# Patient Record
Sex: FEMALE | Race: WHITE | URBAN - METROPOLITAN AREA
[De-identification: names, ages, dates, MRNs, and addresses within clinical notes are randomized per-mention and may not be internally consistent; named-entity substitution may affect disease eponyms.]

---

## 2020-01-28 ENCOUNTER — APPOINTMENT (RX ONLY)
Dept: URBAN - METROPOLITAN AREA OTHER 10 | Facility: OTHER | Age: 25
Setting detail: DERMATOLOGY
End: 2020-01-28

## 2020-01-28 DIAGNOSIS — L738 OTHER SPECIFIED DISEASES OF HAIR AND HAIR FOLLICLES: ICD-10-CM

## 2020-01-28 DIAGNOSIS — L663 OTHER SPECIFIED DISEASES OF HAIR AND HAIR FOLLICLES: ICD-10-CM

## 2020-01-28 DIAGNOSIS — L73.9 FOLLICULAR DISORDER, UNSPECIFIED: ICD-10-CM

## 2020-01-28 PROBLEM — L02.422 FURUNCLE OF LEFT AXILLA: Status: ACTIVE | Noted: 2020-01-28

## 2020-01-28 PROBLEM — L02.421 FURUNCLE OF RIGHT AXILLA: Status: ACTIVE | Noted: 2020-01-28

## 2020-01-28 PROCEDURE — ? COUNSELING

## 2020-01-28 PROCEDURE — 99202 OFFICE O/P NEW SF 15 MIN: CPT

## 2020-01-28 PROCEDURE — ? PRESCRIPTION

## 2020-01-28 PROCEDURE — ? TREATMENT REGIMEN

## 2020-01-28 RX ORDER — HYDROCORTISONE AND IODOCHLORHYDROXYQUIN 5; 30 MG/G; MG/G
CREAM TOPICAL
Qty: 80 | Refills: 0 | Status: ERX | COMMUNITY
Start: 2020-01-28

## 2020-01-28 RX ADMIN — HYDROCORTISONE AND IODOCHLORHYDROXYQUIN: 5; 30 CREAM TOPICAL at 00:00

## 2020-01-28 ASSESSMENT — SEVERITY ASSESSMENT: SEVERITY: MILD

## 2020-01-28 ASSESSMENT — LOCATION DETAILED DESCRIPTION DERM
LOCATION DETAILED: LEFT AXILLARY VAULT
LOCATION DETAILED: RIGHT AXILLARY VAULT

## 2020-01-28 ASSESSMENT — LOCATION ZONE DERM: LOCATION ZONE: AXILLAE

## 2020-01-28 ASSESSMENT — LOCATION SIMPLE DESCRIPTION DERM
LOCATION SIMPLE: RIGHT AXILLARY VAULT
LOCATION SIMPLE: LEFT AXILLARY VAULT

## 2020-01-28 ASSESSMENT — PAIN INTENSITY VAS: HOW INTENSE IS YOUR PAIN 0 BEING NO PAIN, 10 BEING THE MOST SEVERE PAIN POSSIBLE?: NO PAIN

## 2024-06-11 ENCOUNTER — INITIAL PRENATAL (OUTPATIENT)
Dept: OBGYN | Facility: CLINIC | Age: 29
End: 2024-06-11
Payer: MEDICAID

## 2024-06-11 ENCOUNTER — APPOINTMENT (OUTPATIENT)
Dept: OBGYN | Facility: CLINIC | Age: 29
End: 2024-06-11
Payer: MEDICAID

## 2024-06-11 ENCOUNTER — HOSPITAL ENCOUNTER (OUTPATIENT)
Facility: MEDICAL CENTER | Age: 29
End: 2024-06-11
Payer: MEDICAID

## 2024-06-11 ENCOUNTER — APPOINTMENT (OUTPATIENT)
Dept: RADIOLOGY | Facility: IMAGING CENTER | Age: 29
End: 2024-06-11
Payer: MEDICAID

## 2024-06-11 VITALS
HEIGHT: 67 IN | DIASTOLIC BLOOD PRESSURE: 60 MMHG | SYSTOLIC BLOOD PRESSURE: 110 MMHG | BODY MASS INDEX: 23.89 KG/M2 | WEIGHT: 152.2 LBS

## 2024-06-11 DIAGNOSIS — Z98.890 H/O MYOMECTOMY: ICD-10-CM

## 2024-06-11 DIAGNOSIS — Z34.81 PRENATAL CARE, SUBSEQUENT PREGNANCY, FIRST TRIMESTER: ICD-10-CM

## 2024-06-11 DIAGNOSIS — O34.219 PREVIOUS CESAREAN DELIVERY AFFECTING PREGNANCY: ICD-10-CM

## 2024-06-11 DIAGNOSIS — Z34.81 ENCOUNTER FOR SUPERVISION OF OTHER NORMAL PREGNANCY, FIRST TRIMESTER: ICD-10-CM

## 2024-06-11 DIAGNOSIS — N91.2 AMENORRHEA: ICD-10-CM

## 2024-06-11 PROBLEM — Z34.01 ENCOUNTER FOR SUPERVISION OF NORMAL FIRST PREGNANCY IN FIRST TRIMESTER: Status: ACTIVE | Noted: 2024-06-11

## 2024-06-11 PROBLEM — Z34.01 ENCOUNTER FOR SUPERVISION OF NORMAL FIRST PREGNANCY IN FIRST TRIMESTER: Status: RESOLVED | Noted: 2024-06-11 | Resolved: 2024-06-11

## 2024-06-11 PROCEDURE — 87510 GARDNER VAG DNA DIR PROBE: CPT

## 2024-06-11 PROCEDURE — 3074F SYST BP LT 130 MM HG: CPT

## 2024-06-11 PROCEDURE — 87591 N.GONORRHOEAE DNA AMP PROB: CPT

## 2024-06-11 PROCEDURE — 87660 TRICHOMONAS VAGIN DIR PROBE: CPT

## 2024-06-11 PROCEDURE — 3078F DIAST BP <80 MM HG: CPT

## 2024-06-11 PROCEDURE — 76801 OB US < 14 WKS SINGLE FETUS: CPT | Mod: TC

## 2024-06-11 PROCEDURE — 88175 CYTOPATH C/V AUTO FLUID REDO: CPT

## 2024-06-11 PROCEDURE — 87480 CANDIDA DNA DIR PROBE: CPT

## 2024-06-11 PROCEDURE — 87491 CHLMYD TRACH DNA AMP PROBE: CPT

## 2024-06-11 PROCEDURE — 0500F INITIAL PRENATAL CARE VISIT: CPT

## 2024-06-11 ASSESSMENT — EDINBURGH POSTNATAL DEPRESSION SCALE (EPDS)
I HAVE BEEN ABLE TO LAUGH AND SEE THE FUNNY SIDE OF THINGS: AS MUCH AS I ALWAYS COULD
I HAVE BEEN SO UNHAPPY THAT I HAVE BEEN CRYING: NO, NEVER
THE THOUGHT OF HARMING MYSELF HAS OCCURRED TO ME: NEVER
I HAVE BLAMED MYSELF UNNECESSARILY WHEN THINGS WENT WRONG: NOT VERY OFTEN
THINGS HAVE BEEN GETTING ON TOP OF ME: NO, I HAVE BEEN COPING AS WELL AS EVER
I HAVE BEEN SO UNHAPPY THAT I HAVE HAD DIFFICULTY SLEEPING: NOT AT ALL
I HAVE LOOKED FORWARD WITH ENJOYMENT TO THINGS: RATHER LESS THAN I USED TO
TOTAL SCORE: 5
I HAVE FELT SAD OR MISERABLE: NO, NOT AT ALL
I HAVE BEEN ANXIOUS OR WORRIED FOR NO GOOD REASON: YES, SOMETIMES
I HAVE FELT SCARED OR PANICKY FOR NO GOOD REASON: NO, NOT MUCH

## 2024-06-11 NOTE — PROGRESS NOTES
NOB today  LMP:04-09-24  GA : 10w2d  SWAPNIL : [birth year]  Last pap: 2021 wnl due today   Phone #[phone]  Pharmacy confirmed   On PNV   Genetic screening yes   EPDS= 5   c/o a lot of pelvic tenderness nausea,shortness of breath

## 2024-06-12 LAB
CANDIDA DNA VAG QL PROBE+SIG AMP: NEGATIVE
G VAGINALIS DNA VAG QL PROBE+SIG AMP: NEGATIVE
T VAGINALIS DNA VAG QL PROBE+SIG AMP: NEGATIVE

## 2024-06-14 LAB
C TRACH RRNA CVX QL NAA+PROBE: NEGATIVE
COMMENT NL11729A: NORMAL
CYTOLOGIST CVX/VAG CYTO: NORMAL
CYTOLOGY CVX/VAG DOC CYTO: NORMAL
CYTOLOGY CVX/VAG DOC THIN PREP: NORMAL
N GONORRHOEA RRNA CVX QL NAA+PROBE: NEGATIVE
NOTE NL11727A: NORMAL
OTHER STN SPEC: NORMAL
STAT OF ADQ CVX/VAG CYTO-IMP: NORMAL

## 2024-06-18 ENCOUNTER — HOSPITAL ENCOUNTER (OUTPATIENT)
Dept: LAB | Facility: MEDICAL CENTER | Age: 29
End: 2024-06-18
Payer: MEDICAID

## 2024-06-18 DIAGNOSIS — Z34.81 PRENATAL CARE, SUBSEQUENT PREGNANCY, FIRST TRIMESTER: ICD-10-CM

## 2024-06-18 LAB
ABO GROUP BLD: NORMAL
BLD GP AB SCN SERPL QL: NORMAL
ERYTHROCYTE [DISTWIDTH] IN BLOOD BY AUTOMATED COUNT: 40.3 FL (ref 35.9–50)
HBV SURFACE AG SER QL: NORMAL
HCT VFR BLD AUTO: 41.8 % (ref 37–47)
HCV AB SER QL: NORMAL
HGB BLD-MCNC: 14.3 G/DL (ref 12–16)
HIV 1+2 AB+HIV1 P24 AG SERPL QL IA: NORMAL
MCH RBC QN AUTO: 31.4 PG (ref 27–33)
MCHC RBC AUTO-ENTMCNC: 34.2 G/DL (ref 32.2–35.5)
MCV RBC AUTO: 91.9 FL (ref 81.4–97.8)
PLATELET # BLD AUTO: 245 K/UL (ref 164–446)
PMV BLD AUTO: 9.7 FL (ref 9–12.9)
RBC # BLD AUTO: 4.55 M/UL (ref 4.2–5.4)
RH BLD: NORMAL
RUBV AB SER QL: >500 IU/ML
T PALLIDUM AB SER QL IA: NORMAL
WBC # BLD AUTO: 8 K/UL (ref 4.8–10.8)

## 2024-06-18 PROCEDURE — 87086 URINE CULTURE/COLONY COUNT: CPT

## 2024-06-18 PROCEDURE — 80307 DRUG TEST PRSMV CHEM ANLYZR: CPT

## 2024-06-18 PROCEDURE — 86901 BLOOD TYPING SEROLOGIC RH(D): CPT

## 2024-06-18 PROCEDURE — 86900 BLOOD TYPING SEROLOGIC ABO: CPT

## 2024-06-18 PROCEDURE — 86762 RUBELLA ANTIBODY: CPT

## 2024-06-18 PROCEDURE — 86850 RBC ANTIBODY SCREEN: CPT

## 2024-06-18 PROCEDURE — 36415 COLL VENOUS BLD VENIPUNCTURE: CPT

## 2024-06-18 PROCEDURE — 87340 HEPATITIS B SURFACE AG IA: CPT

## 2024-06-18 PROCEDURE — 85027 COMPLETE CBC AUTOMATED: CPT

## 2024-06-18 PROCEDURE — 86780 TREPONEMA PALLIDUM: CPT

## 2024-06-18 PROCEDURE — 87389 HIV-1 AG W/HIV-1&-2 AB AG IA: CPT

## 2024-06-18 PROCEDURE — 86803 HEPATITIS C AB TEST: CPT

## 2024-06-19 LAB
AMPHET CTO UR CFM-MCNC: NEGATIVE NG/ML
BARBITURATES CTO UR CFM-MCNC: NEGATIVE NG/ML
BENZODIAZ CTO UR CFM-MCNC: NEGATIVE NG/ML
CANNABINOIDS CTO UR CFM-MCNC: NEGATIVE NG/ML
COCAINE CTO UR CFM-MCNC: NEGATIVE NG/ML
CREAT UR-MCNC: 80.3 MG/DL (ref 20–400)
DRUG COMMENT 753798: NORMAL
METHADONE CTO UR CFM-MCNC: NEGATIVE NG/ML
OPIATES CTO UR CFM-MCNC: NEGATIVE NG/ML
PCP CTO UR CFM-MCNC: NEGATIVE NG/ML
PROPOXYPH CTO UR CFM-MCNC: NEGATIVE NG/ML

## 2024-06-20 LAB
BACTERIA UR CULT: NORMAL
SIGNIFICANT IND 70042: NORMAL
SITE SITE: NORMAL
SOURCE SOURCE: NORMAL

## 2024-06-27 LAB
Lab: NORMAL
NTRA 1P36 DELETION SYNDROME POPULATION-BASED RISK TEXT: NORMAL
NTRA 1P36 DELETION SYNDROME RESULT TEXT: NORMAL
NTRA 1P36 DELETION SYNDROME RISK SCORE TEXT: NORMAL
NTRA 22Q11.2 DELETION SYNDROME POPULATION-BASED RISK TEXT: NORMAL
NTRA 22Q11.2 DELETION SYNDROME RESULT TEXT: NORMAL
NTRA 22Q11.2 DELETION SYNDROME RISK SCORE TEXT: NORMAL
NTRA ANGELMAN SYNDROME POPULATION-BASED RISK TEXT: NORMAL
NTRA ANGELMAN SYNDROME RESULT TEXT: NORMAL
NTRA ANGELMAN SYNDROME RISK SCORE TEXT: NORMAL
NTRA CRI-DU-CHAT SYNDROME POPULATION-BASED RISK TEXT: NORMAL
NTRA CRI-DU-CHAT SYNDROME RESULT TEXT: NORMAL
NTRA CRI-DU-CHAT SYNDROME RISK SCORE TEXT: NORMAL
NTRA FETAL FRACTION: NORMAL
NTRA GENDER OF FETUS: NORMAL
NTRA MONOSOMY X AGE-BASED RISK TEXT: NORMAL
NTRA MONOSOMY X RESULT TEXT: NORMAL
NTRA MONOSOMY X RISK SCORE TEXT: NORMAL
NTRA PRADER-WILLI SYNDROME POPULATION-BASED RISK TEXT: NORMAL
NTRA PRADER-WILLI SYNDROME RESULT TEXT: NORMAL
NTRA PRADER-WILLI SYNDROME RISK SCORE TEXT: NORMAL
NTRA TRIPLOIDY RESULT TEXT: NORMAL
NTRA TRISOMY 13 AGE-BASED RISK TEXT: NORMAL
NTRA TRISOMY 13 RESULT TEXT: NORMAL
NTRA TRISOMY 13 RISK SCORE TEXT: NORMAL
NTRA TRISOMY 18 AGE-BASED RISK TEXT: NORMAL
NTRA TRISOMY 18 RESULT TEXT: NORMAL
NTRA TRISOMY 18 RISK SCORE TEXT: NORMAL
NTRA TRISOMY 21 AGE-BASED RISK TEXT: NORMAL
NTRA TRISOMY 21 RESULT TEXT: NORMAL
NTRA TRISOMY 21 RISK SCORE TEXT: NORMAL

## 2024-08-01 ENCOUNTER — ROUTINE PRENATAL (OUTPATIENT)
Dept: OBGYN | Facility: CLINIC | Age: 29
End: 2024-08-01
Payer: MEDICAID

## 2024-08-01 VITALS — SYSTOLIC BLOOD PRESSURE: 104 MMHG | DIASTOLIC BLOOD PRESSURE: 56 MMHG | BODY MASS INDEX: 24.28 KG/M2 | WEIGHT: 155 LBS

## 2024-08-01 DIAGNOSIS — O34.219 PREVIOUS CESAREAN DELIVERY AFFECTING PREGNANCY: ICD-10-CM

## 2024-08-01 PROCEDURE — 0502F SUBSEQUENT PRENATAL CARE: CPT | Performed by: OBSTETRICS & GYNECOLOGY

## 2024-08-01 PROCEDURE — 3078F DIAST BP <80 MM HG: CPT | Performed by: OBSTETRICS & GYNECOLOGY

## 2024-08-01 PROCEDURE — 3074F SYST BP LT 130 MM HG: CPT | Performed by: OBSTETRICS & GYNECOLOGY

## 2024-08-01 NOTE — PROGRESS NOTES
OB Followup;    17w4d    Patient Active Problem List    Diagnosis Date Noted    Previous CS--desires TOLAC (see note) 2024    Prenatal care, subsequent pregnancy, first trimester 2024    History of back surgery 2024    ?H/O myomectomy 2024       Vitals:    24 1257   BP: 104/56   Weight: 155 lb       Patient presents for followup of OB care. Currently doing well . Good fetal movement no leakage of fluid no contractions or vaginal bleeding        Size equals dates, normal fetal heart rate      #1 previous  section performed in [place] for arrest of dilatation 6-7 cm per patient history-9 pound infant  Patient would like   Patient is a candidate for  and we discussed moderating her weight gain as she gained 55 pounds with her last pregnancy to increase her chances for a smaller infant we discussed the difficulty of accurate size determination and her third trimester ultrasound.   counseling performed discussed 1% risk of uterine rupture with need for emergency  section and possible neurologic sequela to infant and possible heavy blood loss as well as all other risks as notated in the  consent form she is being given extensive opportunity to ask questions and reviewed all of these in detail.   consent signed  Operation note from her previous  section would be helpful  Due to history of previous 9 pound infant recommend early 1 hour Glucola as well as MSAFP-NIPS testing low risk female    Patient does not desire permanent sterilization      Labor precautions given  Discussed proper weight gain during pregnancy.    Signs and symptoms of labor/ labor discussed   Discussed proper exercise during pregnancy  Discussed proper oral fluid hydration  Reviewed fetal kick counts and appropriate fetal movement during pregnancy  Reviewed postpartum birth control methods  All questions answered in detail    Followup in  4 weeks

## 2024-08-20 ENCOUNTER — ROUTINE PRENATAL (OUTPATIENT)
Dept: OBGYN | Facility: CLINIC | Age: 29
End: 2024-08-20
Payer: MEDICAID

## 2024-08-20 VITALS — DIASTOLIC BLOOD PRESSURE: 52 MMHG | WEIGHT: 157 LBS | BODY MASS INDEX: 24.59 KG/M2 | SYSTOLIC BLOOD PRESSURE: 98 MMHG

## 2024-08-20 DIAGNOSIS — Z34.82 PRENATAL CARE, SUBSEQUENT PREGNANCY IN SECOND TRIMESTER: Primary | ICD-10-CM

## 2024-08-20 PROBLEM — Z34.80 PRENATAL CARE, SUBSEQUENT PREGNANCY: Status: ACTIVE | Noted: 2024-06-11

## 2024-08-20 PROCEDURE — 3074F SYST BP LT 130 MM HG: CPT | Performed by: NURSE PRACTITIONER

## 2024-08-20 PROCEDURE — 3078F DIAST BP <80 MM HG: CPT | Performed by: NURSE PRACTITIONER

## 2024-08-20 PROCEDURE — 0502F SUBSEQUENT PRENATAL CARE: CPT | Performed by: NURSE PRACTITIONER

## 2024-08-20 NOTE — PROGRESS NOTES
OB follow up   + fetal movement.  No VB, LOF or UC's.  Phone # [phone]  Preferred pharmacy confirmed.  AFP maternal, 1 GTT not done yet.  US scheduled for 9/16/24

## 2024-08-23 ENCOUNTER — HOSPITAL ENCOUNTER (OUTPATIENT)
Dept: LAB | Facility: MEDICAL CENTER | Age: 29
End: 2024-08-23
Attending: OBSTETRICS & GYNECOLOGY
Payer: MEDICAID

## 2024-08-23 DIAGNOSIS — O34.219 PREVIOUS CESAREAN DELIVERY AFFECTING PREGNANCY: ICD-10-CM

## 2024-08-23 LAB — GLUCOSE 1H P 50 G GLC PO SERPL-MCNC: 85 MG/DL (ref 70–139)

## 2024-08-23 PROCEDURE — 36415 COLL VENOUS BLD VENIPUNCTURE: CPT

## 2024-08-23 PROCEDURE — 82950 GLUCOSE TEST: CPT

## 2024-08-23 PROCEDURE — 82105 ALPHA-FETOPROTEIN SERUM: CPT

## 2024-08-26 LAB
# FETUSES US: NORMAL
AFP MOM SERPL: 1.06
AFP SERPL-MCNC: 69 NG/ML
AGE - REPORTED: 28.5 YR
CURRENT SMOKER: NO
FAMILY MEMBER DISEASES HX: NO
GA METHOD: NORMAL
GA: NORMAL WK
IDDM PATIENT QL: NO
INTEGRATED SCN PATIENT-IMP: NORMAL
SPECIMEN DRAWN SERPL: NORMAL

## 2024-09-04 ENCOUNTER — ROUTINE PRENATAL (OUTPATIENT)
Dept: OBGYN | Facility: CLINIC | Age: 29
End: 2024-09-04
Payer: MEDICAID

## 2024-09-04 VITALS — DIASTOLIC BLOOD PRESSURE: 56 MMHG | BODY MASS INDEX: 25.15 KG/M2 | WEIGHT: 160.6 LBS | SYSTOLIC BLOOD PRESSURE: 106 MMHG

## 2024-09-04 DIAGNOSIS — Z98.890 H/O MYOMECTOMY: ICD-10-CM

## 2024-09-04 DIAGNOSIS — O34.219 PREVIOUS CESAREAN DELIVERY AFFECTING PREGNANCY: ICD-10-CM

## 2024-09-04 DIAGNOSIS — Z34.82 PRENATAL CARE, SUBSEQUENT PREGNANCY IN SECOND TRIMESTER: ICD-10-CM

## 2024-09-04 PROCEDURE — 3078F DIAST BP <80 MM HG: CPT | Performed by: OBSTETRICS & GYNECOLOGY

## 2024-09-04 PROCEDURE — 0502F SUBSEQUENT PRENATAL CARE: CPT | Performed by: OBSTETRICS & GYNECOLOGY

## 2024-09-04 PROCEDURE — 3074F SYST BP LT 130 MM HG: CPT | Performed by: OBSTETRICS & GYNECOLOGY

## 2024-09-04 NOTE — PROGRESS NOTES
OB Visit Note - 22w3d     MEDICAL DECISION MAKING:  Kelly Alcocer is a 29 y.o. female  at 22w3d    Today's visit addressed:   Doing well. Some mild pain on sides of pelvis, likely ligament pain. No VB or LOF. Good FM.   Requests bedside sono today.     Pregnancy complicated by:  Patient Active Problem List   Diagnosis    Previous CS--desires TOLAC (see note)    Prenatal care, subsequent pregnancy    History of back surgery    ?H/O myomectomy     Patient tried to schedule her anatomy scan however was told it was the 'wrong order'. She has not had her anatomy scan yet.  New order for stat anatomy US placed.    Patient did fill out records request through medical records however no confirmation that this was done, and no records seen. Another records request to be filled out and sent for her prior C/S op report. If hx myomectomy and/or vertical uterine incision, would not be a candidate for TOLAC.     The patient will follow up in 4 week(s). She was counseled to call or return for vaginal bleeding, regular contractions, leakage of fluid.    Elizabeth Sanchez M.D.

## 2024-09-04 NOTE — PROGRESS NOTES
Pt here today for OBFV   +FM movemnet  No VB, LOF. Uc's   Phone number [phone]  Pharmacy verified   Pt states soreness on side, and do I need a us for 20 weeks haven't had one

## 2024-09-05 ENCOUNTER — HOSPITAL ENCOUNTER (OUTPATIENT)
Dept: RADIOLOGY | Facility: MEDICAL CENTER | Age: 29
End: 2024-09-05
Attending: OBSTETRICS & GYNECOLOGY
Payer: MEDICAID

## 2024-09-05 ENCOUNTER — TELEPHONE (OUTPATIENT)
Dept: OBGYN | Facility: CLINIC | Age: 29
End: 2024-09-05

## 2024-09-05 DIAGNOSIS — Z34.82 PRENATAL CARE, SUBSEQUENT PREGNANCY IN SECOND TRIMESTER: ICD-10-CM

## 2024-09-05 PROCEDURE — 76805 OB US >/= 14 WKS SNGL FETUS: CPT

## 2024-09-06 ENCOUNTER — TELEPHONE (OUTPATIENT)
Dept: OBGYN | Facility: CLINIC | Age: 29
End: 2024-09-06
Payer: MEDICAID

## 2024-09-06 NOTE — TELEPHONE ENCOUNTER
Pt called for results to her US. I told pt they have not been reviewed but I will send a message to the provider to review. Pt stated she understands and call was disconnected.

## 2024-09-11 ENCOUNTER — TELEPHONE (OUTPATIENT)
Dept: OBGYN | Facility: CLINIC | Age: 29
End: 2024-09-11
Payer: MEDICAID

## 2024-09-11 NOTE — TELEPHONE ENCOUNTER
Patient called stating she would like to know if her ultrasound was okay. Informed patient that if results are normal we usually don't give a call but she wanted to be sure.

## 2024-09-17 DIAGNOSIS — O44.40 LOW-LYING PLACENTA: ICD-10-CM

## 2024-09-18 ENCOUNTER — TELEPHONE (OUTPATIENT)
Dept: OBGYN | Facility: CLINIC | Age: 29
End: 2024-09-18
Payer: MEDICAID

## 2024-10-02 ENCOUNTER — ROUTINE PRENATAL (OUTPATIENT)
Dept: OBGYN | Facility: CLINIC | Age: 29
End: 2024-10-02
Payer: MEDICAID

## 2024-10-02 VITALS — BODY MASS INDEX: 26.03 KG/M2 | SYSTOLIC BLOOD PRESSURE: 110 MMHG | DIASTOLIC BLOOD PRESSURE: 60 MMHG | WEIGHT: 166.2 LBS

## 2024-10-02 DIAGNOSIS — Z34.82 PRENATAL CARE, SUBSEQUENT PREGNANCY IN SECOND TRIMESTER: ICD-10-CM

## 2024-10-02 DIAGNOSIS — Z34.83 PRENATAL CARE, SUBSEQUENT PREGNANCY, THIRD TRIMESTER: ICD-10-CM

## 2024-10-02 DIAGNOSIS — Z34.82 PRENATAL CARE, SUBSEQUENT PREGNANCY, SECOND TRIMESTER: ICD-10-CM

## 2024-10-02 PROCEDURE — 3078F DIAST BP <80 MM HG: CPT

## 2024-10-02 PROCEDURE — 0502F SUBSEQUENT PRENATAL CARE: CPT

## 2024-10-02 PROCEDURE — 3074F SYST BP LT 130 MM HG: CPT

## 2024-10-28 ENCOUNTER — ANCILLARY PROCEDURE (OUTPATIENT)
Dept: OBGYN | Facility: CLINIC | Age: 29
End: 2024-10-28
Payer: MEDICAID

## 2024-10-28 ENCOUNTER — APPOINTMENT (OUTPATIENT)
Dept: OBGYN | Facility: CLINIC | Age: 29
End: 2024-10-28
Payer: MEDICAID

## 2024-10-28 VITALS
BODY MASS INDEX: 27.1 KG/M2 | SYSTOLIC BLOOD PRESSURE: 127 MMHG | HEART RATE: 75 BPM | WEIGHT: 173 LBS | DIASTOLIC BLOOD PRESSURE: 75 MMHG

## 2024-10-28 VITALS — DIASTOLIC BLOOD PRESSURE: 60 MMHG | BODY MASS INDEX: 27.1 KG/M2 | SYSTOLIC BLOOD PRESSURE: 108 MMHG | WEIGHT: 173 LBS

## 2024-10-28 DIAGNOSIS — Z34.83 PRENATAL CARE, SUBSEQUENT PREGNANCY IN THIRD TRIMESTER: ICD-10-CM

## 2024-10-28 DIAGNOSIS — O44.40 LOW-LYING PLACENTA: ICD-10-CM

## 2024-10-28 PROCEDURE — 90472 IMMUNIZATION ADMIN EACH ADD: CPT | Performed by: MIDWIFE

## 2024-10-28 PROCEDURE — 90656 IIV3 VACC NO PRSV 0.5 ML IM: CPT | Performed by: MIDWIFE

## 2024-10-28 PROCEDURE — 90715 TDAP VACCINE 7 YRS/> IM: CPT | Mod: JZ | Performed by: MIDWIFE

## 2024-10-28 PROCEDURE — 90471 IMMUNIZATION ADMIN: CPT | Performed by: MIDWIFE

## 2024-10-28 PROCEDURE — 76816 OB US FOLLOW-UP PER FETUS: CPT | Performed by: OBSTETRICS & GYNECOLOGY

## 2024-10-30 PROBLEM — O36.5930 POOR FETAL GROWTH AFFECTING MANAGEMENT OF MOTHER IN THIRD TRIMESTER: Status: ACTIVE | Noted: 2024-10-30

## 2024-11-05 ENCOUNTER — APPOINTMENT (OUTPATIENT)
Dept: OBGYN | Facility: CLINIC | Age: 29
End: 2024-11-05
Payer: MEDICAID

## 2024-11-13 ENCOUNTER — HOSPITAL ENCOUNTER (OUTPATIENT)
Dept: LAB | Facility: MEDICAL CENTER | Age: 29
End: 2024-11-13
Attending: MIDWIFE
Payer: MEDICAID

## 2024-11-13 DIAGNOSIS — Z34.83 PRENATAL CARE, SUBSEQUENT PREGNANCY IN THIRD TRIMESTER: ICD-10-CM

## 2024-11-13 LAB
BASOPHILS # BLD AUTO: 0.3 % (ref 0–1.8)
BASOPHILS # BLD: 0.02 K/UL (ref 0–0.12)
EOSINOPHIL # BLD AUTO: 0.06 K/UL (ref 0–0.51)
EOSINOPHIL NFR BLD: 0.8 % (ref 0–6.9)
ERYTHROCYTE [DISTWIDTH] IN BLOOD BY AUTOMATED COUNT: 45.7 FL (ref 35.9–50)
GLUCOSE 1H P 50 G GLC PO SERPL-MCNC: 100 MG/DL (ref 70–139)
HCT VFR BLD AUTO: 39.5 % (ref 37–47)
HGB BLD-MCNC: 12.8 G/DL (ref 12–16)
IMM GRANULOCYTES # BLD AUTO: 0.03 K/UL (ref 0–0.11)
IMM GRANULOCYTES NFR BLD AUTO: 0.4 % (ref 0–0.9)
LYMPHOCYTES # BLD AUTO: 1.66 K/UL (ref 1–4.8)
LYMPHOCYTES NFR BLD: 21.6 % (ref 22–41)
MCH RBC QN AUTO: 31.3 PG (ref 27–33)
MCHC RBC AUTO-ENTMCNC: 32.4 G/DL (ref 32.2–35.5)
MCV RBC AUTO: 96.6 FL (ref 81.4–97.8)
MONOCYTES # BLD AUTO: 0.45 K/UL (ref 0–0.85)
MONOCYTES NFR BLD AUTO: 5.9 % (ref 0–13.4)
NEUTROPHILS # BLD AUTO: 5.47 K/UL (ref 1.82–7.42)
NEUTROPHILS NFR BLD: 71 % (ref 44–72)
NRBC # BLD AUTO: 0 K/UL
NRBC BLD-RTO: 0 /100 WBC (ref 0–0.2)
PLATELET # BLD AUTO: 243 K/UL (ref 164–446)
PMV BLD AUTO: 9.7 FL (ref 9–12.9)
RBC # BLD AUTO: 4.09 M/UL (ref 4.2–5.4)
T PALLIDUM AB SER QL IA: NORMAL
WBC # BLD AUTO: 7.7 K/UL (ref 4.8–10.8)

## 2024-11-13 PROCEDURE — 82950 GLUCOSE TEST: CPT

## 2024-11-13 PROCEDURE — 36415 COLL VENOUS BLD VENIPUNCTURE: CPT

## 2024-11-13 PROCEDURE — 85025 COMPLETE CBC W/AUTO DIFF WBC: CPT

## 2024-11-13 PROCEDURE — 86780 TREPONEMA PALLIDUM: CPT

## 2024-11-14 ENCOUNTER — ROUTINE PRENATAL (OUTPATIENT)
Dept: OBGYN | Facility: CLINIC | Age: 29
End: 2024-11-14
Payer: MEDICAID

## 2024-11-14 ENCOUNTER — APPOINTMENT (OUTPATIENT)
Dept: OBGYN | Facility: CLINIC | Age: 29
End: 2024-11-14
Attending: OBSTETRICS & GYNECOLOGY
Payer: MEDICAID

## 2024-11-14 ENCOUNTER — NON-PROVIDER VISIT (OUTPATIENT)
Dept: OBGYN | Facility: CLINIC | Age: 29
End: 2024-11-14
Payer: MEDICAID

## 2024-11-14 VITALS — SYSTOLIC BLOOD PRESSURE: 110 MMHG | WEIGHT: 173 LBS | BODY MASS INDEX: 27.1 KG/M2 | DIASTOLIC BLOOD PRESSURE: 60 MMHG

## 2024-11-14 DIAGNOSIS — Z98.890 H/O MYOMECTOMY: ICD-10-CM

## 2024-11-14 DIAGNOSIS — O36.5930 POOR FETAL GROWTH AFFECTING MANAGEMENT OF MOTHER IN THIRD TRIMESTER, SINGLE OR UNSPECIFIED FETUS: ICD-10-CM

## 2024-11-14 DIAGNOSIS — Z34.83 PRENATAL CARE, SUBSEQUENT PREGNANCY IN THIRD TRIMESTER: ICD-10-CM

## 2024-11-14 PROBLEM — O44.40 LOW-LYING PLACENTA: Status: RESOLVED | Noted: 2024-09-17 | Resolved: 2024-11-14

## 2024-11-14 PROCEDURE — 3078F DIAST BP <80 MM HG: CPT | Performed by: MIDWIFE

## 2024-11-14 PROCEDURE — 3074F SYST BP LT 130 MM HG: CPT | Performed by: MIDWIFE

## 2024-11-14 PROCEDURE — 0502F SUBSEQUENT PRENATAL CARE: CPT | Performed by: MIDWIFE

## 2024-11-14 NOTE — PROGRESS NOTES
Pt here today for OB follow up  Pt states no complaints  Labs and US done  Reports +FM  Good # [phone] (home)    Pharmacy Confirmed.  Chaperone offered and declined.

## 2024-11-14 NOTE — PROGRESS NOTES
S: Pt is a 29 y.o.  at 32w4d gestation here today for routine prenatal care.     Concerns today include:  Denies concerns. Need resend RYLIE for myomectomy notes    Denies: vaginal bleeding, pelvic and abdominal pain, cramping, contractions, leaking of fluid, urinary and vaginal symptoms    Pt reports fetal movement as Present     O: /60   Wt 173 lb   LMP 2024 (Exact Date)   BMI 27.10 kg/m²    *see prenatal flowsheet*     Labs:     Prenatal labs: Completed and normal  GCT: 100   GBS: Not yet collected  Genetic testing: NIPT low risk  STI testing: negative    Ultrasound: none since last visit    Nonstress Test  Reason for NST: Intrauterine growth restriction  Variability: Moderate  Decelerations: None  Accelerations: No  Acoustic Stimulator: No  Baseline: 140  Uterine Irritability: No  Contractions: Not present  Nonstress Test Interpretation  Comments: NST reactive per my read    A/P:     Problem List Items Addressed This Visit          Unprioritized    Prenatal care, subsequent pregnancy     Pt is a 29 y.o.  at 32w4d gestation here today for routine prenatal care.   Size equal to dates  FGR  RNST    Discuss  labor and pre-eclampsia precautions.  Discuss FMA and FKCs  Address concerns: Discuss need to clarify if myomectomy occurred as this would be an indication for repeat CS. Records were sent 10/2 but nothing received. Will resend RYLIE today.   GBS Not yet collected  Rh positive  Tdap and flu: Administered prior visit  Reviewed 3rd tri labs: normal  Labs ordered: none  Imaging ordered: none   RTC for US and NST next week. RTC for PNV in 2 wks.          ?H/O myomectomy     Records request sent again today         Poor fetal growth affecting management of mother in third trimester     -No US follow up since diagnosis of FGR. Had US scheduled today but had to cancel because she did not have childcare.  -NST reactive - unclear if mild variables x2 occurred at very beginning of tracing  and baseline was indeterminate for about 20-30 minutes as baby was very active. Reviewed tracing with Dr. Sharma who agrees that overall the tracing is very reassuring.   - US is rescheduled for 11/19 at 1100  - Plan for wkly NST

## 2024-11-14 NOTE — ASSESSMENT & PLAN NOTE
-No US follow up since diagnosis of FGR. Had US scheduled today but had to cancel because she did not have childcare.  -NST reactive - unclear if mild variables x2 occurred at very beginning of tracing and baseline was indeterminate for about 20-30 minutes as baby was very active. Reviewed tracing with Dr. Sharma who agrees that overall the tracing is very reassuring.   - US is rescheduled for 11/19 at 1100  - Plan for wkly NST

## 2024-11-14 NOTE — ASSESSMENT & PLAN NOTE
Pt is a 29 y.o.  at 32w4d gestation here today for routine prenatal care.   Size equal to dates  FGR  RNST    Discuss  labor and pre-eclampsia precautions.  Discuss FMA and FKCs  Address concerns: Discuss need to clarify if myomectomy occurred as this would be an indication for repeat CS. Records were sent 10/2 but nothing received. Will resend RYLIE today.   GBS Not yet collected  Rh positive  Tdap and flu: Administered prior visit  Reviewed 3rd tri labs: normal  Labs ordered: none  Imaging ordered: none   RTC for US and NST next week. RTC for PNV in 2 wks.    Adult

## 2024-11-19 ENCOUNTER — ANCILLARY PROCEDURE (OUTPATIENT)
Dept: OBGYN | Facility: CLINIC | Age: 29
End: 2024-11-19
Attending: OBSTETRICS & GYNECOLOGY
Payer: MEDICAID

## 2024-11-19 VITALS
DIASTOLIC BLOOD PRESSURE: 68 MMHG | SYSTOLIC BLOOD PRESSURE: 128 MMHG | BODY MASS INDEX: 27.69 KG/M2 | HEART RATE: 90 BPM | WEIGHT: 176.8 LBS

## 2024-11-19 DIAGNOSIS — Z34.82 PRENATAL CARE, SUBSEQUENT PREGNANCY IN SECOND TRIMESTER: ICD-10-CM

## 2024-11-19 PROCEDURE — 76820 UMBILICAL ARTERY ECHO: CPT | Performed by: OBSTETRICS & GYNECOLOGY

## 2024-11-19 PROCEDURE — 76816 OB US FOLLOW-UP PER FETUS: CPT | Performed by: OBSTETRICS & GYNECOLOGY

## 2024-11-27 ENCOUNTER — ROUTINE PRENATAL (OUTPATIENT)
Dept: OBGYN | Facility: CLINIC | Age: 29
End: 2024-11-27
Payer: MEDICAID

## 2024-11-27 VITALS — DIASTOLIC BLOOD PRESSURE: 60 MMHG | WEIGHT: 179 LBS | BODY MASS INDEX: 28.04 KG/M2 | SYSTOLIC BLOOD PRESSURE: 110 MMHG

## 2024-11-27 DIAGNOSIS — O34.219 PREVIOUS CESAREAN DELIVERY AFFECTING PREGNANCY: ICD-10-CM

## 2024-11-27 DIAGNOSIS — Z34.83 PRENATAL CARE, SUBSEQUENT PREGNANCY IN THIRD TRIMESTER: ICD-10-CM

## 2024-11-27 PROBLEM — Z98.890 H/O MYOMECTOMY: Status: RESOLVED | Noted: 2024-06-11 | Resolved: 2024-11-27

## 2024-11-27 PROCEDURE — 3078F DIAST BP <80 MM HG: CPT | Performed by: MIDWIFE

## 2024-11-27 PROCEDURE — 3074F SYST BP LT 130 MM HG: CPT | Performed by: MIDWIFE

## 2024-11-27 PROCEDURE — 0502F SUBSEQUENT PRENATAL CARE: CPT | Performed by: MIDWIFE

## 2024-11-27 RX ORDER — BREAST PUMP
1 EACH MISCELLANEOUS PRN
Qty: 1 EACH | Refills: 0 | Status: SHIPPED
Start: 2024-11-27

## 2024-11-27 NOTE — PROGRESS NOTES
S: Pt is a 29 y.o.  at 34w3d gestation here today for routine prenatal care.     Concerns today include:  Pt find records that show she had bilateral teratomas removed during her  with first baby. She did not have a myomectomy. These are reviewed and found in her chart in media, listed under outside labs. She desires TOLAC    Denies: vaginal bleeding, pelvic and abdominal pain, cramping, contractions, leaking of fluid, urinary and vaginal symptoms    Pt reports fetal movement as Present     O: /60   Wt 179 lb   LMP 2024 (Exact Date)   BMI 28.04 kg/m²    *see prenatal flowsheet*     Labs:     Prenatal labs: Completed and normal  GCT: 100   GBS: Not yet collected  Genetic testing: NIPT low risk  STI testing: negative    Ultrasound:     EFW 11%, AC 16%  FGR resolved  A/P:     Problem List Items Addressed This Visit          Unprioritized    Previous CS--desires TOLAC (see note), needs general anesthesia if CS     The patient has a history of previous  delivery and is desiring trial of labor after . Benefits of successful vaginal delivery after  include decreased maternal recovery time and avoidance of adverse complications associated with multiple cesareans.  The risks of trial of labor after  include uterine rupture (less than 1%) hemorrhage, blood transfusion, emergency , and hysterectomy. In the event of uterine rupture, immediate emergency  delivery will be performed. There is no guarantee that  can be performed in an adequate amount of time to prevent fetal brain damage or death.  The patient understands the risks as described and all questions were answered.              Prenatal care, subsequent pregnancy     Pt is a 29 y.o.  at 34w3d gestation here today for routine prenatal care.   Size equal to dates    Discuss  labor and pre-eclampsia precautions.  Discuss FMA and FKCs  Address concerns: Discuss  resolution of FGR. No need for further monitoring. Continue kick counts.   GBS Not yet collected  Rh positive  Tdap and flu Administered prior visit  Labs ordered: none  Imaging ordered: none  RTC 2 wks.          Relevant Medications    Misc. Devices (BREAST PUMP) Misc

## 2024-11-27 NOTE — PROGRESS NOTES
Pt here today for OB follow up  Pt states  she needs a breast pump RX and has pelvic pressure  Labs US done  Reports +FM  Good # [phone] (home)    Pharmacy Confirmed.  Chaperone offered and declined.

## 2024-11-28 NOTE — ASSESSMENT & PLAN NOTE
Pt is a 29 y.o.  at 34w3d gestation here today for routine prenatal care.   Size equal to dates    Discuss  labor and pre-eclampsia precautions.  Discuss FMA and FKCs  Address concerns: Discuss resolution of FGR. No need for further monitoring. Continue kick counts.   GBS Not yet collected  Rh positive  Tdap and flu Administered prior visit  Labs ordered: none  Imaging ordered: none  RTC 2 wks.

## 2024-11-28 NOTE — ASSESSMENT & PLAN NOTE
The patient has a history of previous  delivery and is desiring trial of labor after . Benefits of successful vaginal delivery after  include decreased maternal recovery time and avoidance of adverse complications associated with multiple cesareans.  The risks of trial of labor after  include uterine rupture (less than 1%) hemorrhage, blood transfusion, emergency , and hysterectomy. In the event of uterine rupture, immediate emergency  delivery will be performed. There is no guarantee that  can be performed in an adequate amount of time to prevent fetal brain damage or death.  The patient understands the risks as described and all questions were answered.

## 2024-12-11 ENCOUNTER — HOSPITAL ENCOUNTER (OUTPATIENT)
Facility: MEDICAL CENTER | Age: 29
End: 2024-12-11
Attending: MIDWIFE
Payer: MEDICAID

## 2024-12-11 ENCOUNTER — ROUTINE PRENATAL (OUTPATIENT)
Dept: OBGYN | Facility: CLINIC | Age: 29
End: 2024-12-11
Payer: MEDICAID

## 2024-12-11 VITALS — BODY MASS INDEX: 28.82 KG/M2 | DIASTOLIC BLOOD PRESSURE: 60 MMHG | WEIGHT: 184 LBS | SYSTOLIC BLOOD PRESSURE: 112 MMHG

## 2024-12-11 DIAGNOSIS — Z34.83 PRENATAL CARE, SUBSEQUENT PREGNANCY IN THIRD TRIMESTER: ICD-10-CM

## 2024-12-11 PROCEDURE — 0502F SUBSEQUENT PRENATAL CARE: CPT | Performed by: MIDWIFE

## 2024-12-11 PROCEDURE — 3078F DIAST BP <80 MM HG: CPT | Performed by: MIDWIFE

## 2024-12-11 PROCEDURE — 3074F SYST BP LT 130 MM HG: CPT | Performed by: MIDWIFE

## 2024-12-11 PROCEDURE — 87150 DNA/RNA AMPLIFIED PROBE: CPT

## 2024-12-11 PROCEDURE — 87081 CULTURE SCREEN ONLY: CPT

## 2024-12-11 NOTE — PROGRESS NOTES
Pt here today for OB follow up  Pt states no complaints  GBS today  Up to date with everything  Reports +FM  Good # [phone] (home)    Pharmacy Confirmed.  Chaperone offered and declined.

## 2024-12-11 NOTE — PROGRESS NOTES
S: Pt is a 29 y.o.  at 36w3d gestation here today for routine prenatal care.     Concerns today include:  Denies concerns    Denies: vaginal bleeding, pelvic and abdominal pain, cramping, contractions, leaking of fluid, urinary and vaginal symptoms    Pt reports fetal movement as Present     O: /60   Wt 184 lb   LMP 2024 (Exact Date)   BMI 28.82 kg/m²    *see prenatal flowsheet*     Labs:     Prenatal labs: Completed and normal  GCT: 100   GBS: Collected today  Genetic testing: NIPT LR  STI testing: negative    Ultrasound: None since last appt    A/P:     Problem List Items Addressed This Visit          Unprioritized    Prenatal care, subsequent pregnancy     Pt is a 29 y.o.  at 36w3d gestation here today for routine prenatal care.   Size equal to dates    Discuss Labor and pre-eclampsia precautions.  Discuss FMA and FKCs  Address concerns: Discuss ways to prepare for labor and manage labor pains naturally.  GBS Collected today  Rh positive  Flu and Tdap: Administered prior visit  Labs ordered: none  Imaging ordered: none  RTC 1 wks.          Relevant Orders    GRP B STREP, BY PCR ([facility] BROTH)

## 2024-12-11 NOTE — ASSESSMENT & PLAN NOTE
Pt is a 29 y.o.  at 36w3d gestation here today for routine prenatal care.   Size equal to dates    Discuss Labor and pre-eclampsia precautions.  Discuss FMA and FKCs  Address concerns: Discuss ways to prepare for labor and manage labor pains naturally.  GBS Collected today  Rh positive  Flu and Tdap: Administered prior visit  Labs ordered: none  Imaging ordered: none  RTC 1 wks.

## 2024-12-13 LAB — GP B STREP DNA SPEC QL NAA+PROBE: NEGATIVE

## 2024-12-19 ENCOUNTER — ROUTINE PRENATAL (OUTPATIENT)
Dept: OBGYN | Facility: CLINIC | Age: 29
End: 2024-12-19
Payer: MEDICAID

## 2024-12-19 VITALS — WEIGHT: 182 LBS | BODY MASS INDEX: 28.51 KG/M2 | DIASTOLIC BLOOD PRESSURE: 60 MMHG | SYSTOLIC BLOOD PRESSURE: 120 MMHG

## 2024-12-19 DIAGNOSIS — O34.219 PREVIOUS CESAREAN DELIVERY AFFECTING PREGNANCY: ICD-10-CM

## 2024-12-19 PROCEDURE — 3074F SYST BP LT 130 MM HG: CPT

## 2024-12-19 PROCEDURE — 3078F DIAST BP <80 MM HG: CPT

## 2024-12-19 PROCEDURE — 0502F SUBSEQUENT PRENATAL CARE: CPT

## 2024-12-19 NOTE — PROGRESS NOTES
S: Pt is a 29 y.o.  at 37w4d gestation here today for routine prenatal care. Pt reports fetal movement; denies cramping, vaginal bleeding, and leaking of fluid.     Reports taking some CBE classes though isn't able to get into any labor prep.     O: /60   Wt 182 lb    *see prenatal flowsheet*     A: IUP at 37w4d       S=D         Patient Active Problem List    Diagnosis Date Noted    Poor fetal growth affecting management of mother in third trimester - resolved 11/19 10/30/2024    Previous CS--desires TOLAC (see note), needs general anesthesia if CS 2024    Prenatal care, subsequent pregnancy 2024    History of back surgery 2024       P:   -Discussed normal s/sx pregnancy appropriate for gestational age general discomforts vs warning signs that necessitate evaluation in OB triage. Reviewed fetal movements appropriate for EGA. Reinforced adequate hydration and nutrition.  -Has TOLAC counseling and all questions answered in previous appts per pt  -Disc labor prep, pt would like unmedicated labor  -Discussed labor precautions, including 5-1-1 rule. Instructed to report to hospital with leaking of fluid, concerns for decreased FM, excessive vaginal bleeding.   -Disc SVE and sweep started at 39 weeks  -GBS negative  -RTC in 1 weeks for routine prenatal care      Starr Correa C.N.M.

## 2024-12-19 NOTE — PROGRESS NOTES
Pt here today for OBFV   +FM movemnet  No VB, LOF. 's   Phone number [phone] (home)   Pharmacy verified   GBS- neg     Pt has been having some pelvic pressure, and cold symptoms. Has been taking tea and using the humidifier but it seems to not be helping.

## 2024-12-27 ENCOUNTER — ROUTINE PRENATAL (OUTPATIENT)
Dept: OBGYN | Facility: CLINIC | Age: 29
End: 2024-12-27
Payer: MEDICAID

## 2024-12-27 VITALS — SYSTOLIC BLOOD PRESSURE: 106 MMHG | DIASTOLIC BLOOD PRESSURE: 66 MMHG | BODY MASS INDEX: 28.54 KG/M2 | WEIGHT: 182.2 LBS

## 2024-12-27 DIAGNOSIS — Z34.83 PRENATAL CARE, SUBSEQUENT PREGNANCY IN THIRD TRIMESTER: ICD-10-CM

## 2024-12-27 DIAGNOSIS — O34.219 PREVIOUS CESAREAN DELIVERY AFFECTING PREGNANCY: ICD-10-CM

## 2024-12-27 PROCEDURE — 3074F SYST BP LT 130 MM HG: CPT | Performed by: PHYSICIAN ASSISTANT

## 2024-12-27 PROCEDURE — 0502F SUBSEQUENT PRENATAL CARE: CPT | Performed by: PHYSICIAN ASSISTANT

## 2024-12-27 PROCEDURE — 3078F DIAST BP <80 MM HG: CPT | Performed by: PHYSICIAN ASSISTANT

## 2024-12-27 NOTE — PROGRESS NOTES
Pt has no complaints with cramping, UCs, Vb, LOF. +FM. Pt states she is adamantly hoping for TOLAC, as she would need general anesthesia with C/S. Pt also considering 3-4 babies in life. GBS neg. Cervix: Ft/60/-2, post, soft, vtx. Labor precautions stressed, daily FKC recommended. IOL sent for 40wk, though pt aware the MD may suggest repeat C/S based on unfavorable cervix. RTC 1 wk or sooner prn.

## 2024-12-27 NOTE — PROGRESS NOTES
"Pt. Here for OB/FU.   Reports Good FM.    Chaperone offered.   Pt desires a cervical check .  Pt states last night she felt the baby have a \"spasm\" and noticed the movement was very quick.   Phone/Pharm verified.   [phone]    "

## 2025-01-02 ENCOUNTER — APPOINTMENT (OUTPATIENT)
Dept: OBGYN | Facility: CLINIC | Age: 30
End: 2025-01-02
Payer: MEDICAID

## 2025-01-02 VITALS — WEIGHT: 186 LBS | DIASTOLIC BLOOD PRESSURE: 68 MMHG | BODY MASS INDEX: 29.13 KG/M2 | SYSTOLIC BLOOD PRESSURE: 128 MMHG

## 2025-01-02 DIAGNOSIS — Z34.83 PRENATAL CARE, SUBSEQUENT PREGNANCY IN THIRD TRIMESTER: ICD-10-CM

## 2025-01-02 PROCEDURE — 3078F DIAST BP <80 MM HG: CPT | Performed by: MIDWIFE

## 2025-01-02 PROCEDURE — 0502F SUBSEQUENT PRENATAL CARE: CPT | Performed by: MIDWIFE

## 2025-01-02 PROCEDURE — 3074F SYST BP LT 130 MM HG: CPT | Performed by: MIDWIFE

## 2025-01-02 NOTE — PROGRESS NOTES
Pt here today for OB follow up  Pt states no complaints  Wants a membrane sweep  IOL 1/7  GBS neg  Reports +FM  Good # [phone]   Pharmacy Confirmed.  Chaperone offered and declined.

## 2025-01-02 NOTE — PROGRESS NOTES
S: Pt is a 29 y.o.  at 39w4d gestation here today for routine prenatal care.     Concerns today include:  Desires postponing IOL    Denies: vaginal bleeding, pelvic and abdominal pain, cramping, contractions, leaking of fluid, urinary and vaginal symptoms    Pt reports fetal movement as Present     O: /68   Wt 186 lb   LMP 2024 (Exact Date)   BMI 29.13 kg/m²    *see prenatal flowsheet*     Labs:     Prenatal labs: Completed and normal  GCT: 100   GBS: Negative.  Genetic testing: NIPT LR  STI testing: negative    Ultrasound: none since last appt    A/P:     Problem List Items Addressed This Visit          Unprioritized    Prenatal care, subsequent pregnancy     Pt is a 29 y.o.  at 39w4d gestation here today for routine prenatal care.   Size equal to dates  SVE 1.5/50/-2    Discuss Labor and pre-eclampsia precautions.  Discuss FMA and FKCs  Address concerns: Will reschedule her IOL. Membrane sweep provided  GBS Negative.  Rh positive  Labs ordered: none  Imaging ordered: none  RTC 1 wks.

## 2025-01-02 NOTE — ASSESSMENT & PLAN NOTE
Pt is a 29 y.o.  at 39w4d gestation here today for routine prenatal care.   Size equal to dates  SVE 1.5/50/-2    Discuss Labor and pre-eclampsia precautions.  Discuss FMA and FKCs  Address concerns: Will reschedule her IOL. Membrane sweep provided  GBS Negative.  Rh positive  Labs ordered: none  Imaging ordered: none  RTC 1 wks.

## 2025-01-06 ENCOUNTER — ROUTINE PRENATAL (OUTPATIENT)
Dept: OBGYN | Facility: CLINIC | Age: 30
End: 2025-01-06
Payer: MEDICAID

## 2025-01-06 VITALS — DIASTOLIC BLOOD PRESSURE: 60 MMHG | BODY MASS INDEX: 29.41 KG/M2 | WEIGHT: 187.8 LBS | SYSTOLIC BLOOD PRESSURE: 120 MMHG

## 2025-01-06 DIAGNOSIS — Z34.83 PRENATAL CARE, SUBSEQUENT PREGNANCY IN THIRD TRIMESTER: ICD-10-CM

## 2025-01-06 PROCEDURE — 3074F SYST BP LT 130 MM HG: CPT

## 2025-01-06 PROCEDURE — 0502F SUBSEQUENT PRENATAL CARE: CPT

## 2025-01-06 PROCEDURE — 3078F DIAST BP <80 MM HG: CPT

## 2025-01-06 NOTE — PROGRESS NOTES
S: Pt is a 29 y.o.  at 40w1d gestation here today for routine prenatal care. Pt reports fetal movement; denies cramping, vaginal bleeding, and leaking of fluid. Concerns today include no signs of labor so far. Has been spending lots of time on the ball though doesn't feel much pelvic pressure currently.       O: /60   Wt 187 lb 12.8 oz    *see prenatal flowsheet*     A: IUP at 40w1d       S=D         Patient Active Problem List    Diagnosis Date Noted    Poor fetal growth affecting management of mother in third trimester - resolved 11/19 10/30/2024    Previous CS--desires TOLAC (see note), needs general anesthesia if CS 2024    Prenatal care, subsequent pregnancy 2024    History of back surgery 2024       P:   -Discussed normal s/sx pregnancy appropriate for gestational age general discomforts vs warning signs that necessitate evaluation in OB triage. Reviewed fetal movements appropriate for EGA. Reinforced adequate hydration and nutrition.  -Discussed labor precautions, including 5-1-1 rule. Instructed to report to hospital with leaking of fluid, concerns for decreased FM, excessive vaginal bleeding.   -Discussed signs and symptoms of preeclampsia, including HA that is not resolved by rest/Tylenol/hydration, changes in vision, RUQ pain, or sudden increase in swelling.   -Has IOL scheduled 1/10, though pt would like to move it to  at 41 weeks gestation; discussed process today and what to expect. Encouraged to do kick counts daily.      Starr Correa C.N.M.

## 2025-01-06 NOTE — PROGRESS NOTES
Pt here today for OBFV   +FM movemnet  No VB, LOF. Uc's   Phone number [phone]  Pharmacy verified   Pt states she wants  to see if she can move her induction day to Sunday instead of Friday, pt lost her mucus plug 2 days ago

## 2025-01-08 ENCOUNTER — ROUTINE PRENATAL (OUTPATIENT)
Dept: OBGYN | Facility: CLINIC | Age: 30
End: 2025-01-08
Payer: MEDICAID

## 2025-01-08 VITALS — SYSTOLIC BLOOD PRESSURE: 117 MMHG | BODY MASS INDEX: 29.76 KG/M2 | DIASTOLIC BLOOD PRESSURE: 64 MMHG | WEIGHT: 190 LBS

## 2025-01-08 DIAGNOSIS — Z34.83 PRENATAL CARE, SUBSEQUENT PREGNANCY IN THIRD TRIMESTER: ICD-10-CM

## 2025-01-08 PROCEDURE — 3074F SYST BP LT 130 MM HG: CPT | Performed by: MIDWIFE

## 2025-01-08 PROCEDURE — 3078F DIAST BP <80 MM HG: CPT | Performed by: MIDWIFE

## 2025-01-08 PROCEDURE — 0502F SUBSEQUENT PRENATAL CARE: CPT | Performed by: MIDWIFE

## 2025-01-08 NOTE — PROGRESS NOTES
Pt here today for OB follow up  Pt states she would like a membrane sweep and has been experiencing watery discharge  Reports +FM  Good # [phone] (home)    Pharmacy Confirmed.  Chaperone offered and declined.

## 2025-01-08 NOTE — PROGRESS NOTES
S: Pt is a 29 y.o.  at 40w3d gestation here today for routine prenatal care.     Concerns today include:  Denies concerns    Denies: vaginal bleeding, pelvic and abdominal pain, cramping, contractions, leaking of fluid, urinary and vaginal symptoms    Pt reports fetal movement as Present     O: /64   Wt 190 lb   LMP 2024 (Exact Date)   BMI 29.76 kg/m²    *see prenatal flowsheet*     Labs:     Prenatal labs: Completed and normal  GCT: 100   GBS: Negative.  Genetic testing: NIPT LR  STI testing: negative    Ultrasound: None since last visit    A/P:     Problem List Items Addressed This Visit          Unprioritized    Prenatal care, subsequent pregnancy     Pt is a 29 y.o.  at 40w3d gestation here today for routine prenatal care.   Size equal to dates    Discuss Labor and pre-eclampsia precautions.  Discuss FMA and FKCs  Address concerns: None. Performed membrane sweep and patient tolerated well.  GBS Negative.  Labs ordered: none  Imaging ordered: none  Go to IOL

## 2025-01-09 NOTE — ASSESSMENT & PLAN NOTE
Pt is a 29 y.o.  at 40w3d gestation here today for routine prenatal care.   Size equal to dates    Discuss Labor and pre-eclampsia precautions.  Discuss FMA and FKCs  Address concerns: None. Performed membrane sweep and patient tolerated well.  GBS Negative.  Labs ordered: none  Imaging ordered: none  Go to IOL

## 2025-01-10 DIAGNOSIS — Z34.83 PRENATAL CARE, SUBSEQUENT PREGNANCY IN THIRD TRIMESTER: ICD-10-CM

## 2025-01-11 ENCOUNTER — HOSPITAL ENCOUNTER (OUTPATIENT)
Facility: MEDICAL CENTER | Age: 30
End: 2025-01-11
Attending: OBSTETRICS & GYNECOLOGY | Admitting: OBSTETRICS & GYNECOLOGY
Payer: MEDICAID

## 2025-01-11 VITALS
HEIGHT: 67 IN | DIASTOLIC BLOOD PRESSURE: 77 MMHG | OXYGEN SATURATION: 98 % | WEIGHT: 190 LBS | HEART RATE: 80 BPM | SYSTOLIC BLOOD PRESSURE: 126 MMHG | TEMPERATURE: 97.4 F | BODY MASS INDEX: 29.82 KG/M2

## 2025-01-11 PROCEDURE — 59025 FETAL NON-STRESS TEST: CPT | Mod: 26,GC | Performed by: OBSTETRICS & GYNECOLOGY

## 2025-01-11 PROCEDURE — 99212 OFFICE O/P EST SF 10 MIN: CPT | Mod: 25,GC | Performed by: OBSTETRICS & GYNECOLOGY

## 2025-01-11 PROCEDURE — 59025 FETAL NON-STRESS TEST: CPT

## 2025-01-11 NOTE — PROGRESS NOTES
EDC -  EGA - 40-6    1105 - Pt arrived to labor and delivery for scheduled NST. Pt placed in room LDA6.  1118 - External monitors in place X2. Category I FHT at this time. VSS. Pt reports good FM. No complaints of contractions, ROM or vaginal bleeding. POC discussed with pt, all questions answered.   1130 - Report given to Dr Carlisle including possible hx of vertical uterine incision.   1215 - Dr Carlisle at bedside. Pt desires to TOLAC.   1415 - Discussed pt with Dr De La Cruz. Pt okay'd for DC. Pt to return on Monday for IOL.   1430 - DC instructions reviewed. Pt expressed understanding. Pt ambulated off unit in stable condition with belongings in hand.

## 2025-01-11 NOTE — ED PROVIDER NOTES
"LABOR & DELIVERY TRIAGE HISTORY AND PHYSICAL  Patient Name: Kelly Alcocer Age/Sex: 29 y.o. female  : 1995 MRN: [id number]      HISTORY OF PRESENT ILLNESS:   Kelly Alcocer is a 29 y.o.  at 40w6d weeks gestation by LMP equal to 10w2d ultrasound who is here for NST.     Presenting for NST with scheduled IOL TOLAC on 25 for post-dates. Denies LOF, vaginal bleeding, abdominal pain, and is feeling FM.   History of CS in  in [place] for arrest of descent d/t uterine teratomas that were removed as well. In chart is reference to \"CS L Vertical\" but patient states she does not remember hearing any mention of vertical incisions and thinks she had a transverse incision for the CS. Has been unable to get OBGYN records since previous [place] [facility] practice closed last year. Hospital was [facility] in [place], [place], and patient consents for request of records to verify if previous CS was transverse. Patient's records have been requested by [facility] clinic twice last year, but never had any records received.     Her EDC is 2025, by Ultrasound.   She has received prenatal care with [facility]    Complications during pregnancy:   -Poor fetal growth affecting management of mother in third trimester - resolved    -Previous CS--desires TOLAC (see note), needs general anesthesia if CS     History of STD: none     OBSTETRICAL HISTORY:    OB History    Para Term  AB Living   2 1 1     1   SAB IAB Ectopic Molar Multiple Live Births             1      # Outcome Date GA Lbr Julio/2nd Weight Sex Type Anes PTL Lv   2 Current            1 Term 22 41w0d  4.082 kg (9 lb) F CS-LVertical   DYANA      Birth Comments: c seation to remove cysts, they block baby from dropping down       MEDICAL HISTORY:    Past Medical History:   Diagnosis Date    Low-lying placenta 2024    3 cm from os         SURGICAL HISTORY:    Past Surgical History:   Procedure Laterality Date    PRIMARY C " "SECTION  2022    removed 2 cysts    EXC./BIOPSY MASS BACK         MEDICATIONS:    Medications Prior to Admission   Medication Sig Dispense Refill Last Dose/Taking    Misc. Devices (BREAST PUMP) Misc 1 Units as needed (breastfeeding). Z39.1 1 Each 0     Prenatal MV-Min-Fe Fum-FA-DHA (PRENATAL 1 PO) Take  by mouth.          ALLERGIES:    No Known Allergies     SOCIAL HISTORY:      reports that she quit smoking about 8 years ago. Her smoking use included cigarettes. She started smoking about 7 years ago. She has a 7 pack-year smoking history. She has never used smokeless tobacco. She reports that she does not currently use alcohol. She reports that she does not use drugs.    FAMILY HISTORY:    Family History   Problem Relation Age of Onset    No Known Problems Mother     No Known Problems Father     No Known Problems Sister     No Known Problems Maternal Grandmother     No Known Problems Maternal Grandfather     No Known Problems Paternal Grandmother     No Known Problems Paternal Grandfather        REVIEW OF SYSTEMS:  Pertinent items are noted in HPI.      PHYSICAL EXAM:    Vitals:    25 1121   BP: 126/77   Pulse: 80   Temp: 36.3 °C (97.4 °F)   TempSrc: Temporal   SpO2: 98%   Weight: 86.2 kg (190 lb)   Height: 1.702 m (5' 7\")     Temp (24hrs), Av.3 °C (97.4 °F), Min:36.3 °C (97.4 °F), Max:36.3 °C (97.4 °F)    General: No acute distress, resting comfortably in bed.  HEENT: normocephalic, nontraumatic, PERRLA, EOMI  Cardiovascular: Heart RRR with no murmurs, rubs or gallops. Distal Pulses 2+  Respiratory: symmetric chest expansion, lungs CTAB, with no wheezes, rales, rhonci  Abdomen: Soft, gravid, nontender, nondistended. Patient has well healed low transverse skin incision only - no evidence of T incision  Musculoskeletal: VARGAS spontaneously, no BLE edema    Sammy Martinez: No UC  FHRM: Baseline 150, moderate variability, + accels, no decels    Prenatal Labs:  HepBSAg NR  HIV NR  RPR NR  Rubella immune  ABO : " "A+    Group B Strep: negative      ASSESSMENT/ PLAN:   Kelly Alcocer is a 29 y.o.  at 40w6d weeks gestation by LMP equal to 10w2d ultrasound who is here for NST.     Reassuring reactive NST and FM and no contractions, LOF, or vaginal bleeding. Scheduled IOL/TOLAC on 25. Attempting to confirm type of incision from previous  CS in [place], as there is mention of \"CS LVertical\" in her chart and does seem to have T-shaped abdominal scar, although pt states she had a transverse CS. Records from [facility] requested by [facility] clinic previously, without success.  -Re-attempt request of CS records to confirm transverse incision  -If unable to confirm records and transverse incision, TOLAC would be contraindicated, and would need rLTCS    Otherwise, patient with reactive NST is cleared to return home with family. Encouraged to see MD/DO for increased painful uterine contractions @ 3-5, vaginal bleeding, loss of fluid, or other serious symptoms.     Discussed case with Dr. De La Cruz, [facility] Attending. Case was discussed and attending agreed with plan prior to discharge of patient.    No follow-up provider specified.     Future Appointments   Date Time Provider Department Center   2025  9:00 AM [facility] None           Constantin Carlisle M.D.  PGY-1 Resident   [facility]   "

## 2025-01-13 ENCOUNTER — ANESTHESIA (OUTPATIENT)
Dept: OBGYN | Facility: MEDICAL CENTER | Age: 30
End: 2025-01-13
Payer: MEDICAID

## 2025-01-13 ENCOUNTER — ANESTHESIA EVENT (OUTPATIENT)
Dept: OBGYN | Facility: MEDICAL CENTER | Age: 30
End: 2025-01-13
Payer: MEDICAID

## 2025-01-13 ENCOUNTER — HOSPITAL ENCOUNTER (INPATIENT)
Facility: MEDICAL CENTER | Age: 30
LOS: 3 days | End: 2025-01-16
Attending: STUDENT IN AN ORGANIZED HEALTH CARE EDUCATION/TRAINING PROGRAM | Admitting: STUDENT IN AN ORGANIZED HEALTH CARE EDUCATION/TRAINING PROGRAM
Payer: MEDICAID

## 2025-01-13 ENCOUNTER — APPOINTMENT (OUTPATIENT)
Dept: RADIOLOGY | Facility: MEDICAL CENTER | Age: 30
End: 2025-01-13
Attending: STUDENT IN AN ORGANIZED HEALTH CARE EDUCATION/TRAINING PROGRAM
Payer: MEDICAID

## 2025-01-13 ENCOUNTER — APPOINTMENT (OUTPATIENT)
Dept: OBGYN | Facility: MEDICAL CENTER | Age: 30
End: 2025-01-13
Attending: STUDENT IN AN ORGANIZED HEALTH CARE EDUCATION/TRAINING PROGRAM
Payer: MEDICAID

## 2025-01-13 DIAGNOSIS — O34.219 PREVIOUS CESAREAN DELIVERY AFFECTING PREGNANCY: ICD-10-CM

## 2025-01-13 PROBLEM — R10.2 PELVIC PAIN IN FEMALE: Status: RESOLVED | Noted: 2025-01-13 | Resolved: 2025-01-13

## 2025-01-13 PROBLEM — Z34.90 ENCOUNTER FOR INDUCTION OF LABOR: Status: ACTIVE | Noted: 2025-01-13

## 2025-01-13 LAB
BASOPHILS # BLD AUTO: 0.2 % (ref 0–1.8)
BASOPHILS # BLD: 0.02 K/UL (ref 0–0.12)
EOSINOPHIL # BLD AUTO: 0.05 K/UL (ref 0–0.51)
EOSINOPHIL NFR BLD: 0.5 % (ref 0–6.9)
ERYTHROCYTE [DISTWIDTH] IN BLOOD BY AUTOMATED COUNT: 45.3 FL (ref 35.9–50)
HCT VFR BLD AUTO: 37.2 % (ref 37–47)
HGB BLD-MCNC: 12.9 G/DL (ref 12–16)
HOLDING TUBE BB 8507: NORMAL
IMM GRANULOCYTES # BLD AUTO: 0.03 K/UL (ref 0–0.11)
IMM GRANULOCYTES NFR BLD AUTO: 0.3 % (ref 0–0.9)
LYMPHOCYTES # BLD AUTO: 2.04 K/UL (ref 1–4.8)
LYMPHOCYTES NFR BLD: 21.5 % (ref 22–41)
MCH RBC QN AUTO: 32.5 PG (ref 27–33)
MCHC RBC AUTO-ENTMCNC: 34.7 G/DL (ref 32.2–35.5)
MCV RBC AUTO: 93.7 FL (ref 81.4–97.8)
MONOCYTES # BLD AUTO: 0.63 K/UL (ref 0–0.85)
MONOCYTES NFR BLD AUTO: 6.7 % (ref 0–13.4)
NEUTROPHILS # BLD AUTO: 6.7 K/UL (ref 1.82–7.42)
NEUTROPHILS NFR BLD: 70.8 % (ref 44–72)
NRBC # BLD AUTO: 0 K/UL
NRBC BLD-RTO: 0 /100 WBC (ref 0–0.2)
PLATELET # BLD AUTO: 214 K/UL (ref 164–446)
PMV BLD AUTO: 10.3 FL (ref 9–12.9)
RBC # BLD AUTO: 3.97 M/UL (ref 4.2–5.4)
T PALLIDUM AB SER QL IA: NORMAL
WBC # BLD AUTO: 9.5 K/UL (ref 4.8–10.8)

## 2025-01-13 PROCEDURE — 770002 HCHG ROOM/CARE - OB PRIVATE (112)

## 2025-01-13 PROCEDURE — 700105 HCHG RX REV CODE 258: Performed by: ANESTHESIOLOGY

## 2025-01-13 PROCEDURE — 303615 HCHG EPIDURAL/SPINAL ANESTHESIA FOR LABOR

## 2025-01-13 PROCEDURE — 3E033VJ INTRODUCTION OF OTHER HORMONE INTO PERIPHERAL VEIN, PERCUTANEOUS APPROACH: ICD-10-PCS | Performed by: STUDENT IN AN ORGANIZED HEALTH CARE EDUCATION/TRAINING PROGRAM

## 2025-01-13 PROCEDURE — 36415 COLL VENOUS BLD VENIPUNCTURE: CPT

## 2025-01-13 PROCEDURE — 85025 COMPLETE CBC W/AUTO DIFF WBC: CPT

## 2025-01-13 PROCEDURE — 700105 HCHG RX REV CODE 258: Performed by: STUDENT IN AN ORGANIZED HEALTH CARE EDUCATION/TRAINING PROGRAM

## 2025-01-13 PROCEDURE — 700101 HCHG RX REV CODE 250: Performed by: ANESTHESIOLOGY

## 2025-01-13 PROCEDURE — 86780 TREPONEMA PALLIDUM: CPT

## 2025-01-13 PROCEDURE — A9270 NON-COVERED ITEM OR SERVICE: HCPCS | Performed by: ANESTHESIOLOGY

## 2025-01-13 PROCEDURE — 700111 HCHG RX REV CODE 636 W/ 250 OVERRIDE (IP): Performed by: ANESTHESIOLOGY

## 2025-01-13 PROCEDURE — 160048 HCHG OR STATISTICAL LEVEL 1-5: Performed by: STUDENT IN AN ORGANIZED HEALTH CARE EDUCATION/TRAINING PROGRAM

## 2025-01-13 PROCEDURE — 160041 HCHG SURGERY MINUTES - EA ADDL 1 MIN LEVEL 4: Performed by: STUDENT IN AN ORGANIZED HEALTH CARE EDUCATION/TRAINING PROGRAM

## 2025-01-13 PROCEDURE — 76816 OB US FOLLOW-UP PER FETUS: CPT

## 2025-01-13 PROCEDURE — 160009 HCHG ANES TIME/MIN: Performed by: STUDENT IN AN ORGANIZED HEALTH CARE EDUCATION/TRAINING PROGRAM

## 2025-01-13 PROCEDURE — 160002 HCHG RECOVERY MINUTES (STAT): Performed by: STUDENT IN AN ORGANIZED HEALTH CARE EDUCATION/TRAINING PROGRAM

## 2025-01-13 PROCEDURE — 700111 HCHG RX REV CODE 636 W/ 250 OVERRIDE (IP): Mod: JZ | Performed by: STUDENT IN AN ORGANIZED HEALTH CARE EDUCATION/TRAINING PROGRAM

## 2025-01-13 PROCEDURE — 160035 HCHG PACU - 1ST 60 MINS PHASE I: Performed by: STUDENT IN AN ORGANIZED HEALTH CARE EDUCATION/TRAINING PROGRAM

## 2025-01-13 PROCEDURE — 700101 HCHG RX REV CODE 250: Performed by: STUDENT IN AN ORGANIZED HEALTH CARE EDUCATION/TRAINING PROGRAM

## 2025-01-13 PROCEDURE — C1755 CATHETER, INTRASPINAL: HCPCS | Performed by: STUDENT IN AN ORGANIZED HEALTH CARE EDUCATION/TRAINING PROGRAM

## 2025-01-13 PROCEDURE — 160029 HCHG SURGERY MINUTES - 1ST 30 MINS LEVEL 4: Performed by: STUDENT IN AN ORGANIZED HEALTH CARE EDUCATION/TRAINING PROGRAM

## 2025-01-13 PROCEDURE — 700102 HCHG RX REV CODE 250 W/ 637 OVERRIDE(OP): Performed by: ANESTHESIOLOGY

## 2025-01-13 RX ORDER — ROPIVACAINE HYDROCHLORIDE 2 MG/ML
INJECTION, SOLUTION EPIDURAL; INFILTRATION; PERINEURAL CONTINUOUS
Status: DISCONTINUED | OUTPATIENT
Start: 2025-01-13 | End: 2025-01-16 | Stop reason: HOSPADM

## 2025-01-13 RX ORDER — AZITHROMYCIN 500 MG/5ML
500 INJECTION, POWDER, LYOPHILIZED, FOR SOLUTION INTRAVENOUS ONCE
Status: COMPLETED | OUTPATIENT
Start: 2025-01-13 | End: 2025-01-14

## 2025-01-13 RX ORDER — ROCURONIUM BROMIDE 10 MG/ML
INJECTION, SOLUTION INTRAVENOUS PRN
Status: DISCONTINUED | OUTPATIENT
Start: 2025-01-13 | End: 2025-01-14 | Stop reason: SURG

## 2025-01-13 RX ORDER — CITRIC ACID/SODIUM CITRATE 334-500MG
30 SOLUTION, ORAL ORAL ONCE
Status: COMPLETED | OUTPATIENT
Start: 2025-01-13 | End: 2025-01-13

## 2025-01-13 RX ORDER — OXYTOCIN 10 [USP'U]/ML
10 INJECTION, SOLUTION INTRAMUSCULAR; INTRAVENOUS
Status: DISCONTINUED | OUTPATIENT
Start: 2025-01-13 | End: 2025-01-14 | Stop reason: HOSPADM

## 2025-01-13 RX ORDER — ROPIVACAINE HYDROCHLORIDE 2 MG/ML
INJECTION, SOLUTION EPIDURAL; INFILTRATION PRN
Status: DISCONTINUED | OUTPATIENT
Start: 2025-01-13 | End: 2025-01-14 | Stop reason: SURG

## 2025-01-13 RX ORDER — TERBUTALINE SULFATE 1 MG/ML
0.25 INJECTION, SOLUTION SUBCUTANEOUS
Status: COMPLETED | OUTPATIENT
Start: 2025-01-13 | End: 2025-01-13

## 2025-01-13 RX ORDER — BUPIVACAINE HYDROCHLORIDE 2.5 MG/ML
INJECTION, SOLUTION EPIDURAL; INFILTRATION; INTRACAUDAL
Status: ACTIVE
Start: 2025-01-13 | End: 2025-01-14

## 2025-01-13 RX ORDER — OXYTOCIN 10 [USP'U]/ML
INJECTION, SOLUTION INTRAMUSCULAR; INTRAVENOUS PRN
Status: DISCONTINUED | OUTPATIENT
Start: 2025-01-13 | End: 2025-01-14 | Stop reason: SURG

## 2025-01-13 RX ORDER — PHENYLEPHRINE HYDROCHLORIDE 10 MG/ML
INJECTION, SOLUTION INTRAMUSCULAR; INTRAVENOUS; SUBCUTANEOUS PRN
Status: DISCONTINUED | OUTPATIENT
Start: 2025-01-13 | End: 2025-01-14 | Stop reason: SURG

## 2025-01-13 RX ORDER — LIDOCAINE HYDROCHLORIDE 20 MG/ML
INJECTION, SOLUTION EPIDURAL; INFILTRATION; INTRACAUDAL; PERINEURAL PRN
Status: DISCONTINUED | OUTPATIENT
Start: 2025-01-13 | End: 2025-01-14 | Stop reason: SURG

## 2025-01-13 RX ORDER — SODIUM CHLORIDE, SODIUM LACTATE, POTASSIUM CHLORIDE, CALCIUM CHLORIDE 600; 310; 30; 20 MG/100ML; MG/100ML; MG/100ML; MG/100ML
INJECTION, SOLUTION INTRAVENOUS CONTINUOUS
Status: DISCONTINUED | OUTPATIENT
Start: 2025-01-13 | End: 2025-01-16 | Stop reason: HOSPADM

## 2025-01-13 RX ORDER — CEFAZOLIN SODIUM 1 G/3ML
INJECTION, POWDER, FOR SOLUTION INTRAMUSCULAR; INTRAVENOUS PRN
Status: DISCONTINUED | OUTPATIENT
Start: 2025-01-13 | End: 2025-01-14 | Stop reason: SURG

## 2025-01-13 RX ORDER — DEXAMETHASONE SODIUM PHOSPHATE 4 MG/ML
INJECTION, SOLUTION INTRA-ARTICULAR; INTRALESIONAL; INTRAMUSCULAR; INTRAVENOUS; SOFT TISSUE PRN
Status: DISCONTINUED | OUTPATIENT
Start: 2025-01-13 | End: 2025-01-14 | Stop reason: SURG

## 2025-01-13 RX ORDER — SODIUM CHLORIDE, SODIUM GLUCONATE, SODIUM ACETATE, POTASSIUM CHLORIDE AND MAGNESIUM CHLORIDE 526; 502; 368; 37; 30 MG/100ML; MG/100ML; MG/100ML; MG/100ML; MG/100ML
INJECTION, SOLUTION INTRAVENOUS
Status: DISCONTINUED | OUTPATIENT
Start: 2025-01-13 | End: 2025-01-14 | Stop reason: SURG

## 2025-01-13 RX ORDER — SODIUM CHLORIDE, SODIUM LACTATE, POTASSIUM CHLORIDE, AND CALCIUM CHLORIDE .6; .31; .03; .02 G/100ML; G/100ML; G/100ML; G/100ML
1000 INJECTION, SOLUTION INTRAVENOUS
Status: COMPLETED | OUTPATIENT
Start: 2025-01-13 | End: 2025-01-14

## 2025-01-13 RX ORDER — LIDOCAINE HYDROCHLORIDE 10 MG/ML
20 INJECTION, SOLUTION INFILTRATION; PERINEURAL
Status: DISCONTINUED | OUTPATIENT
Start: 2025-01-13 | End: 2025-01-14 | Stop reason: HOSPADM

## 2025-01-13 RX ORDER — EPHEDRINE SULFATE 50 MG/ML
5 INJECTION, SOLUTION INTRAVENOUS
Status: DISCONTINUED | OUTPATIENT
Start: 2025-01-13 | End: 2025-01-14 | Stop reason: HOSPADM

## 2025-01-13 RX ORDER — ACETAMINOPHEN 500 MG
1000 TABLET ORAL
Status: DISCONTINUED | OUTPATIENT
Start: 2025-01-13 | End: 2025-01-14 | Stop reason: HOSPADM

## 2025-01-13 RX ORDER — IBUPROFEN 800 MG/1
800 TABLET, FILM COATED ORAL
Status: DISCONTINUED | OUTPATIENT
Start: 2025-01-13 | End: 2025-01-14 | Stop reason: HOSPADM

## 2025-01-13 RX ORDER — METOCLOPRAMIDE HYDROCHLORIDE 5 MG/ML
10 INJECTION INTRAMUSCULAR; INTRAVENOUS ONCE
Status: COMPLETED | OUTPATIENT
Start: 2025-01-13 | End: 2025-01-13

## 2025-01-13 RX ORDER — TRANEXAMIC ACID 100 MG/ML
INJECTION, SOLUTION INTRAVENOUS PRN
Status: DISCONTINUED | OUTPATIENT
Start: 2025-01-13 | End: 2025-01-14 | Stop reason: SURG

## 2025-01-13 RX ORDER — SUCCINYLCHOLINE CHLORIDE 20 MG/ML
INJECTION INTRAMUSCULAR; INTRAVENOUS PRN
Status: DISCONTINUED | OUTPATIENT
Start: 2025-01-13 | End: 2025-01-14 | Stop reason: SURG

## 2025-01-13 RX ORDER — SODIUM CHLORIDE, SODIUM LACTATE, POTASSIUM CHLORIDE, AND CALCIUM CHLORIDE .6; .31; .03; .02 G/100ML; G/100ML; G/100ML; G/100ML
250 INJECTION, SOLUTION INTRAVENOUS PRN
Status: DISCONTINUED | OUTPATIENT
Start: 2025-01-13 | End: 2025-01-14 | Stop reason: HOSPADM

## 2025-01-13 RX ORDER — LIDOCAINE HYDROCHLORIDE AND EPINEPHRINE 15; 5 MG/ML; UG/ML
INJECTION, SOLUTION EPIDURAL PRN
Status: DISCONTINUED | OUTPATIENT
Start: 2025-01-13 | End: 2025-01-14 | Stop reason: SURG

## 2025-01-13 RX ORDER — SODIUM CHLORIDE, SODIUM LACTATE, POTASSIUM CHLORIDE, AND CALCIUM CHLORIDE .6; .31; .03; .02 G/100ML; G/100ML; G/100ML; G/100ML
1000 INJECTION, SOLUTION INTRAVENOUS ONCE
Status: COMPLETED | OUTPATIENT
Start: 2025-01-13 | End: 2025-01-14

## 2025-01-13 RX ADMIN — SODIUM CHLORIDE, POTASSIUM CHLORIDE, SODIUM LACTATE AND CALCIUM CHLORIDE 1000 ML: 600; 310; 30; 20 INJECTION, SOLUTION INTRAVENOUS at 17:05

## 2025-01-13 RX ADMIN — BUPIVACAINE HYDROCHLORIDE 10 ML: 2.5 INJECTION, SOLUTION EPIDURAL; INFILTRATION; INTRACAUDAL; PERINEURAL at 23:08

## 2025-01-13 RX ADMIN — PHENYLEPHRINE HYDROCHLORIDE 200 MCG: 10 INJECTION INTRAVENOUS at 23:55

## 2025-01-13 RX ADMIN — PHENYLEPHRINE HYDROCHLORIDE 200 MCG: 10 INJECTION INTRAVENOUS at 23:58

## 2025-01-13 RX ADMIN — SODIUM CITRATE AND CITRIC ACID MONOHYDRATE 30 ML: 2004; 3000 SOLUTION ORAL at 23:01

## 2025-01-13 RX ADMIN — CEFAZOLIN 2 G: 1 INJECTION, POWDER, FOR SOLUTION INTRAMUSCULAR; INTRAVENOUS at 23:42

## 2025-01-13 RX ADMIN — AZITHROMYCIN DIHYDRATE 500 MG: 500 INJECTION, POWDER, LYOPHILIZED, FOR SOLUTION INTRAVENOUS at 23:02

## 2025-01-13 RX ADMIN — FAMOTIDINE 20 MG: 10 INJECTION, SOLUTION INTRAVENOUS at 23:01

## 2025-01-13 RX ADMIN — ROPIVACAINE HYDROCHLORIDE: 2 INJECTION, SOLUTION EPIDURAL; INFILTRATION at 17:30

## 2025-01-13 RX ADMIN — FENTANYL CITRATE 100 MCG: 50 INJECTION, SOLUTION INTRAMUSCULAR; INTRAVENOUS at 18:20

## 2025-01-13 RX ADMIN — BUPIVACAINE HYDROCHLORIDE 8 ML: 2.5 INJECTION, SOLUTION EPIDURAL; INFILTRATION; INTRACAUDAL; PERINEURAL at 18:20

## 2025-01-13 RX ADMIN — OXYTOCIN 20 UNITS: 10 INJECTION, SOLUTION INTRAMUSCULAR; INTRAVENOUS at 23:46

## 2025-01-13 RX ADMIN — FENTANYL CITRATE 100 MCG: 50 INJECTION, SOLUTION INTRAMUSCULAR; INTRAVENOUS at 23:48

## 2025-01-13 RX ADMIN — SODIUM CHLORIDE, POTASSIUM CHLORIDE, SODIUM LACTATE AND CALCIUM CHLORIDE: 600; 310; 30; 20 INJECTION, SOLUTION INTRAVENOUS at 14:10

## 2025-01-13 RX ADMIN — METOCLOPRAMIDE HYDROCHLORIDE 10 MG: 5 INJECTION INTRAMUSCULAR; INTRAVENOUS at 23:02

## 2025-01-13 RX ADMIN — OXYTOCIN 2 MILLI-UNITS/MIN: 10 INJECTION, SOLUTION INTRAMUSCULAR; INTRAVENOUS at 14:15

## 2025-01-13 RX ADMIN — DEXAMETHASONE SODIUM PHOSPHATE 8 MG: 4 INJECTION INTRA-ARTICULAR; INTRALESIONAL; INTRAMUSCULAR; INTRAVENOUS; SOFT TISSUE at 23:55

## 2025-01-13 RX ADMIN — ROPIVACAINE HYDROCHLORIDE 10 ML: 2 INJECTION EPIDURAL; INFILTRATION; PERINEURAL at 17:16

## 2025-01-13 RX ADMIN — LIDOCAINE HYDROCHLORIDE 80 MG: 20 INJECTION, SOLUTION EPIDURAL; INFILTRATION; INTRACAUDAL; PERINEURAL at 23:42

## 2025-01-13 RX ADMIN — PROPOFOL 200 MG: 10 INJECTION, EMULSION INTRAVENOUS at 23:42

## 2025-01-13 RX ADMIN — TRANEXAMIC ACID 1000 MG: 100 INJECTION, SOLUTION INTRAVENOUS at 23:50

## 2025-01-13 RX ADMIN — ROCURONIUM BROMIDE 5 MG: 50 INJECTION, SOLUTION INTRAVENOUS at 23:42

## 2025-01-13 RX ADMIN — SODIUM CHLORIDE, SODIUM GLUCONATE, SODIUM ACETATE, POTASSIUM CHLORIDE AND MAGNESIUM CHLORIDE: 526; 502; 368; 37; 30 INJECTION, SOLUTION INTRAVENOUS at 17:08

## 2025-01-13 RX ADMIN — FENTANYL CITRATE 100 MCG: 50 INJECTION, SOLUTION INTRAMUSCULAR; INTRAVENOUS at 16:35

## 2025-01-13 RX ADMIN — SUCCINYLCHOLINE CHLORIDE 150 MG: 20 INJECTION, SOLUTION INTRAMUSCULAR; INTRAVENOUS at 23:42

## 2025-01-13 RX ADMIN — SODIUM CHLORIDE, POTASSIUM CHLORIDE, SODIUM LACTATE AND CALCIUM CHLORIDE: 600; 310; 30; 20 INJECTION, SOLUTION INTRAVENOUS at 21:09

## 2025-01-13 RX ADMIN — LIDOCAINE HYDROCHLORIDE,EPINEPHRINE BITARTRATE 5 ML: 15; .005 INJECTION, SOLUTION EPIDURAL; INFILTRATION; INTRACAUDAL; PERINEURAL at 17:13

## 2025-01-13 RX ADMIN — SODIUM CHLORIDE, POTASSIUM CHLORIDE, SODIUM LACTATE AND CALCIUM CHLORIDE 1000 ML: 600; 310; 30; 20 INJECTION, SOLUTION INTRAVENOUS at 23:15

## 2025-01-13 RX ADMIN — PHENYLEPHRINE HYDROCHLORIDE 200 MCG: 10 INJECTION INTRAVENOUS at 23:53

## 2025-01-13 SDOH — ECONOMIC STABILITY: TRANSPORTATION INSECURITY
IN THE PAST 12 MONTHS, HAS THE LACK OF TRANSPORTATION KEPT YOU FROM MEDICAL APPOINTMENTS OR FROM GETTING MEDICATIONS?: NO

## 2025-01-13 SDOH — ECONOMIC STABILITY: TRANSPORTATION INSECURITY
IN THE PAST 12 MONTHS, HAS LACK OF RELIABLE TRANSPORTATION KEPT YOU FROM MEDICAL APPOINTMENTS, MEETINGS, WORK OR FROM GETTING THINGS NEEDED FOR DAILY LIVING?: NO

## 2025-01-13 ASSESSMENT — PAIN DESCRIPTION - PAIN TYPE
TYPE: ACUTE PAIN

## 2025-01-13 ASSESSMENT — LIFESTYLE VARIABLES
TOTAL SCORE: 0
HAVE YOU EVER FELT YOU SHOULD CUT DOWN ON YOUR DRINKING: NO
ON A TYPICAL DAY WHEN YOU DRINK ALCOHOL HOW MANY DRINKS DO YOU HAVE: 0
TOTAL SCORE: 0
EVER FELT BAD OR GUILTY ABOUT YOUR DRINKING: NO
CONSUMPTION TOTAL: NEGATIVE
AVERAGE NUMBER OF DAYS PER WEEK YOU HAVE A DRINK CONTAINING ALCOHOL: 0
DOES PATIENT WANT TO STOP DRINKING: NO
EVER HAD A DRINK FIRST THING IN THE MORNING TO STEADY YOUR NERVES TO GET RID OF A HANGOVER: NO
TOTAL SCORE: 0
HAVE PEOPLE ANNOYED YOU BY CRITICIZING YOUR DRINKING: NO
ALCOHOL_USE: NO
HOW MANY TIMES IN THE PAST YEAR HAVE YOU HAD 5 OR MORE DRINKS IN A DAY: 0

## 2025-01-13 ASSESSMENT — PATIENT HEALTH QUESTIONNAIRE - PHQ9
SUM OF ALL RESPONSES TO PHQ9 QUESTIONS 1 AND 2: 0
1. LITTLE INTEREST OR PLEASURE IN DOING THINGS: NOT AT ALL
1. LITTLE INTEREST OR PLEASURE IN DOING THINGS: NOT AT ALL
2. FEELING DOWN, DEPRESSED, IRRITABLE, OR HOPELESS: NOT AT ALL
2. FEELING DOWN, DEPRESSED, IRRITABLE, OR HOPELESS: NOT AT ALL
SUM OF ALL RESPONSES TO PHQ9 QUESTIONS 1 AND 2: 0

## 2025-01-13 ASSESSMENT — PAIN SCALES - GENERAL: PAINLEVEL: 0 - NO PAIN

## 2025-01-13 ASSESSMENT — SOCIAL DETERMINANTS OF HEALTH (SDOH)
WITHIN THE PAST 12 MONTHS, THE FOOD YOU BOUGHT JUST DIDN'T LAST AND YOU DIDN'T HAVE MONEY TO GET MORE: NEVER TRUE
WITHIN THE LAST YEAR, HAVE YOU BEEN KICKED, HIT, SLAPPED, OR OTHERWISE PHYSICALLY HURT BY YOUR PARTNER OR EX-PARTNER?: NO
WITHIN THE PAST 12 MONTHS, YOU WORRIED THAT YOUR FOOD WOULD RUN OUT BEFORE YOU GOT THE MONEY TO BUY MORE: NEVER TRUE
WITHIN THE LAST YEAR, HAVE TO BEEN RAPED OR FORCED TO HAVE ANY KIND OF SEXUAL ACTIVITY BY YOUR PARTNER OR EX-PARTNER?: NO
IN THE PAST 12 MONTHS, HAS THE ELECTRIC, GAS, OIL, OR WATER COMPANY THREATENED TO SHUT OFF SERVICE IN YOUR HOME?: NO
WITHIN THE LAST YEAR, HAVE YOU BEEN HUMILIATED OR EMOTIONALLY ABUSED IN OTHER WAYS BY YOUR PARTNER OR EX-PARTNER?: NO
WITHIN THE LAST YEAR, HAVE YOU BEEN AFRAID OF YOUR PARTNER OR EX-PARTNER?: NO

## 2025-01-13 NOTE — H&P
History and Physical      Kelly Alcocer is a 29 y.o. year old female  at 41w1d, dated by 10w US who presents for scheduled IOL. She has no complaints. No contractions, vaginal bleeding, or LOF. Feels frequent fetal movement.       ROS: A comprehensive review of systems was negative.    Past Medical History:   Diagnosis Date    Low-lying placenta 2024    3 cm from os       Past Surgical History:   Procedure Laterality Date    PRIMARY C SECTION  2022    removed 2 cysts    EXC./BIOPSY MASS BACK       OB History    Para Term  AB Living   2 1 1     1   SAB IAB Ectopic Molar Multiple Live Births             1      # Outcome Date GA Lbr Julio/2nd Weight Sex Type Anes PTL Lv   2 Current            1 Term 22 41w0d  4.082 kg (9 lb) F CS-LVertical   DYANA      Birth Comments: c seation to remove cysts, they block baby from dropping down     Social History     Socioeconomic History    Marital status: Single     Spouse name: Not on file    Number of children: Not on file    Years of education: Not on file    Highest education level: Not on file   Occupational History    Not on file   Tobacco Use    Smoking status: Former     Current packs/day: 1.00     Average packs/day: 1 pack/day for 7.0 years (7.0 ttl pk-yrs)     Types: Cigarettes     Start date:      Quit date:     Smokeless tobacco: Never   Vaping Use    Vaping status: Former    Substances: Nicotine    Devices: Disposable   Substance and Sexual Activity    Alcohol use: Not Currently     Comment: occ    Drug use: Never    Sexual activity: Yes     Partners: Male     Birth control/protection: Ring     Comment: 2023   Other Topics Concern    Not on file   Social History Narrative    Not on file     Social Drivers of Health     Financial Resource Strain: Not on file   Food Insecurity: Not on file   Transportation Needs: Not on file   Physical Activity: Not on file   Stress: Not on file   Social Connections: Not on file  "  Intimate Partner Violence: Not on file   Housing Stability: Not on file     Allergies: Patient has no known allergies.  No current facility-administered medications for this encounter.    Prenatal care with [facility] starting at 10w with following problems:  Patient Active Problem List    Diagnosis Date Noted    Encounter for induction of labor 01/13/2025    Poor fetal growth affecting management of mother in third trimester - resolved 11/19 10/30/2024    Previous CS--desires TOLAC (see note), needs general anesthesia if CS 06/11/2024    Prenatal care, subsequent pregnancy 06/11/2024    History of back surgery 06/11/2024               Objective:      /74   Pulse 97   Temp 36.7 °C (98.1 °F) (Temporal)   Resp 16   Ht 1.702 m (5' 7\")   Wt 86.2 kg (190 lb)   SpO2 99%   GEN: NAD, speaking full sentences without distress  HEENT: NCAT, PERRLA, moist oral mucosa, anicteric, without pallor  CVS: Extremities warm and well perfused  LUNGS: Unlabored breathing  ABD: Soft, gravid, nontender, nondistended. Patient has well healed low transverse skin incision only - no evidence of T incision, which is nontender to palpable.  BACK: No CVAT  EXT: No cyanosis or edema  NEURO: No focal deficits      SVE: 1.5/10/-4, medium consistency, posterior    EFM: Baseline 135 bpm, moderate variability, + accels, no decels  [place]: Irritable, no clear contractions, none palpated on exam    US-OB LIMITED GROWTH FOLLOW UP Is the patient pregnant? Yes  Order: [id number]   Status: Final result       Visible to patient: Yes (not seen)       Next appt: None    0 Result Notes  Details    Reading Physician Reading Date Result Priority   Ramana Adamson M.D.  [phone] 1/13/2025      Narrative & Impression     1/13/2025 10:01 AM     HISTORY/REASON FOR EXAM:  Routine screening for abnormality; size and dates please     TECHNIQUE/EXAM DESCRIPTION: OB limited ultrasound.     COMPARISON:  None     FINDINGS:  Fetal Lie:  Vertex  LMP:  " 03/31/2024  Clinical SWAPNIL by LMP:  01/05/2025     Placenta (Location):  Posterior  Placenta Previa: No     Amniotic Fluid Volume:  EDWIN = 11.50 cm     Fetal Heart Rate:  136 bpm     Cervical Length:  Maternal uterine cervix is obscured secondary to shadowing from the overlying ossified fetal calvarium.     No maternal adnexal mass is identified.     Fetal Biometry  BPD    9.39 cm, 38 weeks, 2 days  HC    35.29 cm, 41 weeks, 2 days  AC    34.73 cm, 38 weeks, 5 days  Femur Length    7.27 cm, 37 weeks, 2 days     EGA by this US:  39 weeks, 0 days  SWAPNIL by this US: 01/20/2025     Estimated Fetal Weight:  3526 grams  EFW Percentile: Not calculated.     Comments:  Fetal stomach and urinary bladder are fluid-filled, but are not distended. No fetal hydronephrosis. Fetal anatomic survey was not performed due to limited exam.     IMPRESSION:     Single intrauterine pregnancy of an estimated gestational age of 39 weeks, 0 days with an estimated date of delivery of 01/20/2025.     Normal EDWIN.     Estimated fetal weight is listed above.     The maternal uterine cervix is obscured secondary to shadowing from the overlying ossified fetal calvarium.       Lab:   Blood type: A     Recent Results (from the past 35 weeks)   VAGINAL PATHOGENS DNA PANEL    Collection Time: 06/11/24 11:16 AM   Result Value Ref Range    Candida species DNA Probe Negative Negative    Trichamonas vaginalis DNA Probe Negative Negative    Gardnerella vaginalis DNA Probe Negative Negative   THINPREP RFLX HPV ASCUS W/CTNG    Collection Time: 06/11/24 11:16 AM   Result Value Ref Range    DIAGNOSIS: SEE BELOW     Specimen adequacy: SEE BELOW     Performed by: SEE BELOW     Comment Comment     Note: SEE BELOW     Test Methodology: SEE BELOW     Chlamydia, Nuc. Acid Amp Negative Negative    Gonococcus, Nuc. Acid Amp Negative Negative    Comment SEE BELOW    URINE DRUG SCREEN W/CONF (AR)    Collection Time: 06/18/24 10:23 AM   Result Value Ref Range    Urine  Amphetamine-Methamphetam Negative Cutoff 300 ng/mL    Barbiturates Negative Cutoff 200 ng/mL    Benzodiazepines Negative Cutoff 200 ng/mL    Propoxyphene Negative Cutoff 300 ng/mL    Cocaine Metabolite Negative Cutoff 150 ng/mL    Methadone Negative Cutoff 150 ng/mL    Codeine-Morphine Negative Cutoff 300 ng/mL    Phencyclidine -Pcp Negative Cutoff 25 ng/mL    Cannabinoid Metab Negative Cutoff 50 ng/mL    Creatinine Urine 80.3 20.0 - 400.0 mg/dL    Drug Comment Urine Drugs See Note    PREG CNTR PRENATAL PN    Collection Time: 06/18/24 10:23 AM   Result Value Ref Range    WBC 8.0 4.8 - 10.8 K/uL    RBC 4.55 4.20 - 5.40 M/uL    Hemoglobin 14.3 12.0 - 16.0 g/dL    Hematocrit 41.8 37.0 - 47.0 %    MCV 91.9 81.4 - 97.8 fL    MCH 31.4 27.0 - 33.0 pg    MCHC 34.2 32.2 - 35.5 g/dL    RDW 40.3 35.9 - 50.0 fL    Platelet Count 245 164 - 446 K/uL    MPV 9.7 9.0 - 12.9 fL    Hepatitis C Antibody Non-Reactive Non-Reactive    Hepatitis B Surface Antigen Non-Reactive Non-Reactive    Rubella IgG Antibody >500 IU/mL    Syphilis, Treponemal Qual Non-Reactive Non-Reactive   URINE CULTURE(NEW)    Collection Time: 06/18/24 10:23 AM    Specimen: Urine   Result Value Ref Range    Significant Indicator NEG     Source UR     Site Clean Catch     Culture Result Usual urogenital melissa <10,000 cfu/mL    HIV AG/AB COMBO ASSAY SCREENING    Collection Time: 06/18/24 10:23 AM   Result Value Ref Range    HIV Ag/Ab Combo Assay Non-Reactive Non Reactive   OP Prenatal Panel-Blood Bank    Collection Time: 06/18/24 10:23 AM   Result Value Ref Range    ABO Grouping Only A     Rh Grouping Only POS     Antibody Screen Scrn NEG    PANORAMA PRENATAL TEST    Collection Time: 06/27/24  1:15 AM   Result Value Ref Range    REPORT SUMMARY LOW RISK     REPORT NOTE See Notes     GENDER OF FETUS Female     FETAL FRACTION 12.5%     TRISOMY 21 RESULT TEXT Low Risk     TRISOMY 21 AGE-BASED RISK TEXT 1/946 (0.11%)     TRISOMY 21 RISK SCORE TEXT <1/10,000 (<0.01%)      TRISOMY 18 RESULT TEXT Low Risk     TRISOMY 18 AGE-BASED RISK TEXT 1/2,200 (0.05%)     TRISOMY 18 RISK SCORE TEXT <1/10,000 (<0.01%)     TRISOMY 13 RESULT TEXT Low Risk     TRISOMY 13 AGE-BASED RISK TEXT 1/6,930 (0.01%)     TRISOMY 13 RISK SCORE TEXT <1/10,000 (<0.01%)     MONOSOMY X RESULT TEXT Low Risk     MONOSOMY X AGE-BASED RISK TEXT 1/255 (0.39%)     MONOSOMY X RISK SCORE TEXT <1/10,000 (<0.01%)     TRIPLOIDY RESULT TEXT Low Risk     22Q11.2 DELETION SYNDROME RESULT TEXT Low Risk     22Q11.2 DELETION SYNDROME POPULATION-BASED RISK TEXT 1/2,000     22Q11.2 DELETION SYNDROME RISK SCORE TEXT 1/12,000     1P36 DELETION SYNDROME RESULT TEXT Low Risk     1P36 DELETION SYNDROME POPULATION-BASED RISK TEXT 1/5,000     1P36 DELETION SYNDROME RISK SCORE TEXT 1/12,400     ANGELMAN SYNDROME RESULT TEXT Low Risk     ANGELMAN SYNDROME POPULATION-BASED RISK TEXT 1/12,000     ANGELMAN SYNDROME RISK SCORE TEXT 1/16,600     CRI-DU-CHAT SYNDROME RESULT TEXT Low Risk     CRI-DU-CHAT SYNDROME POPULATION-BASED RISK TEXT 1/20,000     CRI-DU-CHAT SYNDROME RISK SCORE TEXT 1/57,100     PRADER-WILLI SYNDROME RESULT TEXT Low Risk     PRADER-WILLI SYNDROME POPULATION-BASED RISK TEXT 1/10,000     PRADER-WILLI SYNDROME RISK SCORE TEXT 1/13,800     FOOTNOTES See Notes    AFP MATERNAL SERUM ALPHA-FETOPROTEIN    Collection Time: 08/23/24  8:14 AM   Result Value Ref Range    AFP Value -Eia 69 ng/mL    AFP MOM Value 1.06     Interpretation Screen Neg     [age]APNIL [age] yr    Maternal Weight 155.0 lbs.     Gest. Age on Collection Date 20 wks, 5 days     Gestational Age Based On Other     Multiple Pregnancy Mccormick     Race Nonblack     Insulin Dependent Diabetes No     Smoking No     Family Hx NTD No     Specimen See Note    GLUCOSE 1HR GESTATIONAL    Collection Time: 08/23/24  8:14 AM   Result Value Ref Range    Glucose, Post Dose 85 70 - 139 mg/dL   GLUCOSE 1HR GESTATIONAL    Collection Time: 11/13/24 10:04 AM   Result Value Ref  Range    Glucose, Post Dose 100 70 - 139 mg/dL   CBC WITH DIFFERENTIAL    Collection Time: 24 10:04 AM   Result Value Ref Range    WBC 7.7 4.8 - 10.8 K/uL    RBC 4.09 (L) 4.20 - 5.40 M/uL    Hemoglobin 12.8 12.0 - 16.0 g/dL    Hematocrit 39.5 37.0 - 47.0 %    MCV 96.6 81.4 - 97.8 fL    MCH 31.3 27.0 - 33.0 pg    MCHC 32.4 32.2 - 35.5 g/dL    RDW 45.7 35.9 - 50.0 fL    Platelet Count 243 164 - 446 K/uL    MPV 9.7 9.0 - 12.9 fL    Neutrophils-Polys 71.00 44.00 - 72.00 %    Lymphocytes 21.60 (L) 22.00 - 41.00 %    Monocytes 5.90 0.00 - 13.40 %    Eosinophils 0.80 0.00 - 6.90 %    Basophils 0.30 0.00 - 1.80 %    Immature Granulocytes 0.40 0.00 - 0.90 %    Nucleated RBC 0.00 0.00 - 0.20 /100 WBC    Neutrophils (Absolute) 5.47 1.82 - 7.42 K/uL    Lymphs (Absolute) 1.66 1.00 - 4.80 K/uL    Monos (Absolute) 0.45 0.00 - 0.85 K/uL    Eos (Absolute) 0.06 0.00 - 0.51 K/uL    Baso (Absolute) 0.02 0.00 - 0.12 K/uL    Immature Granulocytes (abs) 0.03 0.00 - 0.11 K/uL    NRBC (Absolute) 0.00 K/uL   T.PALLIDUM AB ANMOL (SCREENING)    Collection Time: 24 10:04 AM   Result Value Ref Range    Syphilis, Treponemal Qual Non-Reactive Non-Reactive   GRP B STREP, BY PCR (GAMA BROTH)    Collection Time: 24  3:50 PM    Specimen: Genital   Result Value Ref Range    Strep Gp B DNA PCR Negative Negative        Assessment:   Kelly Alcocer is a 28yo  with history of prior  section, who at 41w1d dated by 10w US, is admitted for IOL.     Plan:     #Labor Induction  - Received operative report from [facility] prior to admission this AM. Per report, patient had low transverse/pfannenstiel skin incision and low transverse uterine incision at time of previous  section with bilateral ovarian cystectomy for bilateral mature teratomas. It is unclear where the inconsistencies it previous documentation regarding the nature of this  section arose. She denies having any other uterine surgery (other   section or myomectomy).  - Additionally, there is a note in the operative report that epidural analgesia did not provide sufficient pain relief intra-operatively and that patient required additional intravenous pain and Ketamine at time of procedure. Patient reports she had been told that she would require general anesthesia in any future  sections. Recommend Anesthesia consult upon admission.  - The risks, benefits and alternatives of trial of labor after  (TOLAC) section were discussed with the patient, including but not limited to catastrophic uterine rupture (risk 1/200 (0.5%) with one previous  section, may be as high as 1-2% after two previous  sections), that may result in the death of herself or the infant, need for  section resulting in the loss of her uterus, infection, severe loss of blood, death, thrombosis, injury to bowel, bladder, fistula, injury to blood vessels, injury to fetus, need for blood transfusion.  Pt was also counseled regarding the risks of  section including those mentioned above and, in general, risks of anesthesia, bleeding, infection and collateral damage to adjacent tissues and organs and risks of repeated cesareans including placental implantation abnormalities complicating future pregnancies.  Patient would like to proceed with induction of labor.  - Plan for cervical ripening with cook balloon. Patient requests delaying pitocin start in order to evaluate effect of cervical balloon on contraction pattern.     #FWB  - Reactive NST, continuous EFM while in labor    #Routine Ob  - Rh+/RI  - Contraception: Needs review    Delphine Zeng MD MPH

## 2025-01-13 NOTE — PROGRESS NOTES
1000 Report received from CAYETANO Cotton. POC discussed for TOLAC vs C/S.     1120 Dr. Zeng at bedside to discuss POC for TOLAC IOL. Questions answered. Admission orders received.     1145 Ambulated from LDA6 to S217 in stable condition.    1230 Dr. Zeng at bedside for cooks balloon placement. Patient tolerated procedure well.     1320 Dr. Zeng given report on SROM and discontinuation of cooks balloon. Orders received, see MAR.    1702 Dr. House at bedside for epidural placement. Patient tolerated procedure well. Test dose negative.    1900 Report given to CAYETANO Huynh. POC discussed, questions answered.

## 2025-01-13 NOTE — PROGRESS NOTES
CERVICAL RIPENING BALLOON    COOK BALLOON PROCEDURE NOTE    Prior to placement; fetus confirmed to be cephalic presenting with bedside US    Risks and benefits of the procedure were explained to the patient and she agreed to proceed with the procedure. Pt was placed supine in frog leg position. The cook balloon was placed manually through the cervix. Once in place, the santa bulb was filled with 80cc of sterile saline via the uterine balloon.. The catheter was taped to the medial thigh on tension. The patient tolerated the procedure well with no complications.    Delphine Zeng MD MPH

## 2025-01-13 NOTE — CARE PLAN
Problem: Risk for Infection and Impaired Wound Healing  Goal: Patient will remain free from infection  Outcome: Progressing  Note: VSS. No signs or symptoms of infection at this time. SROM 1310 with clear fluid. Will limit SVE at this time due to latent labor and unmedicated status.      Problem: Pain  Goal: Patient's pain will be alleviated or reduced to the patient’s comfort goal  Outcome: Progressing  Note: Patient undecided on pain management desires. Options given for pain management. Had difficult epidural placement with first pregnancy due to hardware in back from previous trauma. Dr. House given report, and will evaluate patient in the case epidural is desired.    The patient is Stable - Low risk of patient condition declining or worsening

## 2025-01-13 NOTE — PROGRESS NOTES
0945: Pt is a  at 41.1 weeks today, pt presents to L&D for scheduled IOL this morning. Pt reports +FM, denies VB/LOF/Uc's at this time. EFM and TOCO applied, VS taken, pt comfortable in bed at this time. Dr Zeng at bedside, POC discussed.

## 2025-01-14 ENCOUNTER — PHARMACY VISIT (OUTPATIENT)
Dept: PHARMACY | Facility: MEDICAL CENTER | Age: 30
End: 2025-01-14
Payer: COMMERCIAL

## 2025-01-14 PROBLEM — S37.69XA UTERINE RUPTURE: Chronic | Status: ACTIVE | Noted: 2025-01-14

## 2025-01-14 LAB
BASOPHILS # BLD AUTO: 0.1 % (ref 0–1.8)
BASOPHILS # BLD AUTO: 0.2 % (ref 0–1.8)
BASOPHILS # BLD: 0.03 K/UL (ref 0–0.12)
BASOPHILS # BLD: 0.04 K/UL (ref 0–0.12)
EOSINOPHIL # BLD AUTO: 0.03 K/UL (ref 0–0.51)
EOSINOPHIL # BLD AUTO: 0.04 K/UL (ref 0–0.51)
EOSINOPHIL NFR BLD: 0.1 % (ref 0–6.9)
EOSINOPHIL NFR BLD: 0.2 % (ref 0–6.9)
ERYTHROCYTE [DISTWIDTH] IN BLOOD BY AUTOMATED COUNT: 45.8 FL (ref 35.9–50)
ERYTHROCYTE [DISTWIDTH] IN BLOOD BY AUTOMATED COUNT: 46.1 FL (ref 35.9–50)
HCT VFR BLD AUTO: 27.1 % (ref 37–47)
HCT VFR BLD AUTO: 32.9 % (ref 37–47)
HGB BLD-MCNC: 11.1 G/DL (ref 12–16)
HGB BLD-MCNC: 9 G/DL (ref 12–16)
IMM GRANULOCYTES # BLD AUTO: 0.13 K/UL (ref 0–0.11)
IMM GRANULOCYTES # BLD AUTO: 0.15 K/UL (ref 0–0.11)
IMM GRANULOCYTES NFR BLD AUTO: 0.6 % (ref 0–0.9)
IMM GRANULOCYTES NFR BLD AUTO: 0.6 % (ref 0–0.9)
LYMPHOCYTES # BLD AUTO: 1.11 K/UL (ref 1–4.8)
LYMPHOCYTES # BLD AUTO: 2.46 K/UL (ref 1–4.8)
LYMPHOCYTES NFR BLD: 11.5 % (ref 22–41)
LYMPHOCYTES NFR BLD: 4.6 % (ref 22–41)
MCH RBC QN AUTO: 31.9 PG (ref 27–33)
MCH RBC QN AUTO: 32.3 PG (ref 27–33)
MCHC RBC AUTO-ENTMCNC: 33.2 G/DL (ref 32.2–35.5)
MCHC RBC AUTO-ENTMCNC: 33.7 G/DL (ref 32.2–35.5)
MCV RBC AUTO: 95.6 FL (ref 81.4–97.8)
MCV RBC AUTO: 96.1 FL (ref 81.4–97.8)
MONOCYTES # BLD AUTO: 0.9 K/UL (ref 0–0.85)
MONOCYTES # BLD AUTO: 1.36 K/UL (ref 0–0.85)
MONOCYTES NFR BLD AUTO: 3.7 % (ref 0–13.4)
MONOCYTES NFR BLD AUTO: 6.4 % (ref 0–13.4)
NEUTROPHILS # BLD AUTO: 17.35 K/UL (ref 1.82–7.42)
NEUTROPHILS # BLD AUTO: 21.95 K/UL (ref 1.82–7.42)
NEUTROPHILS NFR BLD: 81.3 % (ref 44–72)
NEUTROPHILS NFR BLD: 90.7 % (ref 44–72)
NRBC # BLD AUTO: 0 K/UL
NRBC # BLD AUTO: 0 K/UL
NRBC BLD-RTO: 0 /100 WBC (ref 0–0.2)
NRBC BLD-RTO: 0 /100 WBC (ref 0–0.2)
PLATELET # BLD AUTO: 174 K/UL (ref 164–446)
PLATELET # BLD AUTO: 197 K/UL (ref 164–446)
PMV BLD AUTO: 10.1 FL (ref 9–12.9)
PMV BLD AUTO: 9.9 FL (ref 9–12.9)
RBC # BLD AUTO: 2.82 M/UL (ref 4.2–5.4)
RBC # BLD AUTO: 3.44 M/UL (ref 4.2–5.4)
WBC # BLD AUTO: 21.4 K/UL (ref 4.8–10.8)
WBC # BLD AUTO: 24.2 K/UL (ref 4.8–10.8)

## 2025-01-14 PROCEDURE — 700111 HCHG RX REV CODE 636 W/ 250 OVERRIDE (IP): Mod: JZ | Performed by: ANESTHESIOLOGY

## 2025-01-14 PROCEDURE — A9270 NON-COVERED ITEM OR SERVICE: HCPCS | Performed by: ANESTHESIOLOGY

## 2025-01-14 PROCEDURE — 700111 HCHG RX REV CODE 636 W/ 250 OVERRIDE (IP): Performed by: STUDENT IN AN ORGANIZED HEALTH CARE EDUCATION/TRAINING PROGRAM

## 2025-01-14 PROCEDURE — 36415 COLL VENOUS BLD VENIPUNCTURE: CPT

## 2025-01-14 PROCEDURE — 59515 CESAREAN DELIVERY: CPT | Performed by: STUDENT IN AN ORGANIZED HEALTH CARE EDUCATION/TRAINING PROGRAM

## 2025-01-14 PROCEDURE — 85025 COMPLETE CBC W/AUTO DIFF WBC: CPT | Mod: 91

## 2025-01-14 PROCEDURE — A9270 NON-COVERED ITEM OR SERVICE: HCPCS

## 2025-01-14 PROCEDURE — 700105 HCHG RX REV CODE 258: Performed by: STUDENT IN AN ORGANIZED HEALTH CARE EDUCATION/TRAINING PROGRAM

## 2025-01-14 PROCEDURE — RXMED WILLOW AMBULATORY MEDICATION CHARGE: Performed by: STUDENT IN AN ORGANIZED HEALTH CARE EDUCATION/TRAINING PROGRAM

## 2025-01-14 PROCEDURE — 700102 HCHG RX REV CODE 250 W/ 637 OVERRIDE(OP)

## 2025-01-14 PROCEDURE — 700105 HCHG RX REV CODE 258: Performed by: ANESTHESIOLOGY

## 2025-01-14 PROCEDURE — 770002 HCHG ROOM/CARE - OB PRIVATE (112)

## 2025-01-14 PROCEDURE — A9270 NON-COVERED ITEM OR SERVICE: HCPCS | Performed by: STUDENT IN AN ORGANIZED HEALTH CARE EDUCATION/TRAINING PROGRAM

## 2025-01-14 PROCEDURE — 700102 HCHG RX REV CODE 250 W/ 637 OVERRIDE(OP): Performed by: STUDENT IN AN ORGANIZED HEALTH CARE EDUCATION/TRAINING PROGRAM

## 2025-01-14 PROCEDURE — 700102 HCHG RX REV CODE 250 W/ 637 OVERRIDE(OP): Performed by: ANESTHESIOLOGY

## 2025-01-14 RX ORDER — ONDANSETRON 2 MG/ML
4 INJECTION INTRAMUSCULAR; INTRAVENOUS EVERY 6 HOURS PRN
Status: ACTIVE | OUTPATIENT
Start: 2025-01-14 | End: 2025-01-15

## 2025-01-14 RX ORDER — OXYCODONE HYDROCHLORIDE 5 MG/1
5 TABLET ORAL EVERY 4 HOURS PRN
Qty: 12 TABLET | Refills: 0 | Status: SHIPPED | OUTPATIENT
Start: 2025-01-14 | End: 2025-01-26

## 2025-01-14 RX ORDER — ETONOGESTREL AND ETHINYL ESTRADIOL VAGINAL RING .015; .12 MG/D; MG/D
RING VAGINAL
Qty: 3 EACH | Refills: 4 | Status: SHIPPED | OUTPATIENT
Start: 2025-02-14

## 2025-01-14 RX ORDER — OXYCODONE HCL 5 MG/5 ML
5 SOLUTION, ORAL ORAL
Status: CANCELLED | OUTPATIENT
Start: 2025-01-14

## 2025-01-14 RX ORDER — ONDANSETRON 2 MG/ML
INJECTION INTRAMUSCULAR; INTRAVENOUS PRN
Status: DISCONTINUED | OUTPATIENT
Start: 2025-01-14 | End: 2025-01-14 | Stop reason: SURG

## 2025-01-14 RX ORDER — OXYCODONE HYDROCHLORIDE 5 MG/1
5 TABLET ORAL EVERY 4 HOURS PRN
Status: DISPENSED | OUTPATIENT
Start: 2025-01-14 | End: 2025-01-15

## 2025-01-14 RX ORDER — ACETAMINOPHEN 500 MG
1000 TABLET ORAL EVERY 6 HOURS
Status: DISCONTINUED | OUTPATIENT
Start: 2025-01-15 | End: 2025-01-16 | Stop reason: HOSPADM

## 2025-01-14 RX ORDER — OXYCODONE HCL 5 MG/5 ML
10 SOLUTION, ORAL ORAL
Status: CANCELLED | OUTPATIENT
Start: 2025-01-14

## 2025-01-14 RX ORDER — KETOROLAC TROMETHAMINE 15 MG/ML
INJECTION, SOLUTION INTRAMUSCULAR; INTRAVENOUS PRN
Status: DISCONTINUED | OUTPATIENT
Start: 2025-01-14 | End: 2025-01-14 | Stop reason: SURG

## 2025-01-14 RX ORDER — HALOPERIDOL 5 MG/ML
1 INJECTION INTRAMUSCULAR
Status: CANCELLED | OUTPATIENT
Start: 2025-01-14

## 2025-01-14 RX ORDER — ACETAMINOPHEN 500 MG
1000 TABLET ORAL EVERY 6 HOURS PRN
Status: DISCONTINUED | OUTPATIENT
Start: 2025-01-18 | End: 2025-01-16 | Stop reason: HOSPADM

## 2025-01-14 RX ORDER — POLYETHYLENE GLYCOL 3350 17 G/17G
17 POWDER, FOR SOLUTION ORAL DAILY
Qty: 10 EACH | Refills: 0 | Status: SHIPPED | OUTPATIENT
Start: 2025-01-14

## 2025-01-14 RX ORDER — ACETAMINOPHEN 500 MG
1000 TABLET ORAL EVERY 6 HOURS
Status: COMPLETED | OUTPATIENT
Start: 2025-01-14 | End: 2025-01-14

## 2025-01-14 RX ORDER — ONDANSETRON 2 MG/ML
4 INJECTION INTRAMUSCULAR; INTRAVENOUS
Status: CANCELLED | OUTPATIENT
Start: 2025-01-14

## 2025-01-14 RX ORDER — KETOROLAC TROMETHAMINE 15 MG/ML
15 INJECTION, SOLUTION INTRAMUSCULAR; INTRAVENOUS EVERY 6 HOURS
Status: COMPLETED | OUTPATIENT
Start: 2025-01-14 | End: 2025-01-14

## 2025-01-14 RX ORDER — ONDANSETRON 4 MG/1
4 TABLET, ORALLY DISINTEGRATING ORAL EVERY 6 HOURS PRN
Status: DISCONTINUED | OUTPATIENT
Start: 2025-01-15 | End: 2025-01-16 | Stop reason: HOSPADM

## 2025-01-14 RX ORDER — DOCUSATE SODIUM 100 MG/1
100 CAPSULE, LIQUID FILLED ORAL 2 TIMES DAILY PRN
Status: DISCONTINUED | OUTPATIENT
Start: 2025-01-14 | End: 2025-01-16 | Stop reason: HOSPADM

## 2025-01-14 RX ORDER — MEPERIDINE HYDROCHLORIDE 25 MG/ML
12.5 INJECTION INTRAMUSCULAR; INTRAVENOUS; SUBCUTANEOUS
Status: CANCELLED | OUTPATIENT
Start: 2025-01-14

## 2025-01-14 RX ORDER — SIMETHICONE 125 MG
125 TABLET,CHEWABLE ORAL 4 TIMES DAILY PRN
Status: DISCONTINUED | OUTPATIENT
Start: 2025-01-14 | End: 2025-01-16 | Stop reason: HOSPADM

## 2025-01-14 RX ORDER — IBUPROFEN 800 MG/1
800 TABLET, FILM COATED ORAL EVERY 8 HOURS
Status: DISCONTINUED | OUTPATIENT
Start: 2025-01-15 | End: 2025-01-16 | Stop reason: HOSPADM

## 2025-01-14 RX ORDER — EPHEDRINE SULFATE 50 MG/ML
10 INJECTION, SOLUTION INTRAVENOUS
Status: ACTIVE | OUTPATIENT
Start: 2025-01-14 | End: 2025-01-15

## 2025-01-14 RX ORDER — IBUPROFEN 800 MG/1
800 TABLET, FILM COATED ORAL EVERY 8 HOURS PRN
Status: DISCONTINUED | OUTPATIENT
Start: 2025-01-18 | End: 2025-01-16 | Stop reason: HOSPADM

## 2025-01-14 RX ORDER — MORPHINE SULFATE 4 MG/ML
2 INJECTION INTRAVENOUS
Status: CANCELLED | OUTPATIENT
Start: 2025-01-14

## 2025-01-14 RX ORDER — DIPHENHYDRAMINE HYDROCHLORIDE 50 MG/ML
25 INJECTION INTRAMUSCULAR; INTRAVENOUS EVERY 6 HOURS PRN
Status: DISCONTINUED | OUTPATIENT
Start: 2025-01-15 | End: 2025-01-16 | Stop reason: HOSPADM

## 2025-01-14 RX ORDER — DIPHENHYDRAMINE HCL 25 MG
25 TABLET ORAL EVERY 6 HOURS PRN
Status: DISCONTINUED | OUTPATIENT
Start: 2025-01-15 | End: 2025-01-16 | Stop reason: HOSPADM

## 2025-01-14 RX ORDER — OXYCODONE HYDROCHLORIDE 5 MG/1
5 TABLET ORAL EVERY 4 HOURS PRN
Status: DISCONTINUED | OUTPATIENT
Start: 2025-01-15 | End: 2025-01-16 | Stop reason: HOSPADM

## 2025-01-14 RX ORDER — DIPHENHYDRAMINE HYDROCHLORIDE 50 MG/ML
12.5 INJECTION INTRAMUSCULAR; INTRAVENOUS EVERY 6 HOURS PRN
Status: ACTIVE | OUTPATIENT
Start: 2025-01-14 | End: 2025-01-15

## 2025-01-14 RX ORDER — OXYCODONE HYDROCHLORIDE 10 MG/1
10 TABLET ORAL EVERY 4 HOURS PRN
Status: ACTIVE | OUTPATIENT
Start: 2025-01-14 | End: 2025-01-15

## 2025-01-14 RX ORDER — DIPHENHYDRAMINE HYDROCHLORIDE 50 MG/ML
25 INJECTION INTRAMUSCULAR; INTRAVENOUS EVERY 6 HOURS PRN
Status: ACTIVE | OUTPATIENT
Start: 2025-01-14 | End: 2025-01-15

## 2025-01-14 RX ORDER — MORPHINE SULFATE 4 MG/ML
1 INJECTION INTRAVENOUS
Status: CANCELLED | OUTPATIENT
Start: 2025-01-14

## 2025-01-14 RX ORDER — OXYCODONE HYDROCHLORIDE 10 MG/1
10 TABLET ORAL EVERY 4 HOURS PRN
Status: DISCONTINUED | OUTPATIENT
Start: 2025-01-15 | End: 2025-01-16 | Stop reason: HOSPADM

## 2025-01-14 RX ORDER — MORPHINE SULFATE 0.5 MG/ML
INJECTION, SOLUTION EPIDURAL; INTRATHECAL; INTRAVENOUS PRN
Status: DISCONTINUED | OUTPATIENT
Start: 2025-01-14 | End: 2025-01-14 | Stop reason: SURG

## 2025-01-14 RX ORDER — POLYETHYLENE GLYCOL 3350 17 G/17G
1 POWDER, FOR SOLUTION ORAL DAILY
Status: DISCONTINUED | OUTPATIENT
Start: 2025-01-14 | End: 2025-01-16 | Stop reason: HOSPADM

## 2025-01-14 RX ORDER — ONDANSETRON 2 MG/ML
4 INJECTION INTRAMUSCULAR; INTRAVENOUS EVERY 6 HOURS PRN
Status: DISCONTINUED | OUTPATIENT
Start: 2025-01-15 | End: 2025-01-16 | Stop reason: HOSPADM

## 2025-01-14 RX ORDER — HYDROMORPHONE HYDROCHLORIDE 1 MG/ML
INJECTION, SOLUTION INTRAMUSCULAR; INTRAVENOUS; SUBCUTANEOUS PRN
Status: DISCONTINUED | OUTPATIENT
Start: 2025-01-14 | End: 2025-01-14 | Stop reason: SURG

## 2025-01-14 RX ORDER — SODIUM CHLORIDE, SODIUM LACTATE, POTASSIUM CHLORIDE, CALCIUM CHLORIDE 600; 310; 30; 20 MG/100ML; MG/100ML; MG/100ML; MG/100ML
2000 INJECTION, SOLUTION INTRAVENOUS PRN
Status: DISCONTINUED | OUTPATIENT
Start: 2025-01-14 | End: 2025-01-16

## 2025-01-14 RX ADMIN — PHENYLEPHRINE HYDROCHLORIDE 200 MCG: 10 INJECTION INTRAVENOUS at 00:07

## 2025-01-14 RX ADMIN — HYDROMORPHONE HYDROCHLORIDE 0.5 MG: 1 INJECTION, SOLUTION INTRAMUSCULAR; INTRAVENOUS; SUBCUTANEOUS at 00:32

## 2025-01-14 RX ADMIN — SIMETHICONE 125 MG: 125 TABLET, CHEWABLE ORAL at 22:30

## 2025-01-14 RX ADMIN — CEFAZOLIN 2 G: 2 INJECTION, POWDER, FOR SOLUTION INTRAMUSCULAR; INTRAVENOUS at 06:28

## 2025-01-14 RX ADMIN — KETOROLAC TROMETHAMINE 15 MG: 15 INJECTION, SOLUTION INTRAMUSCULAR; INTRAVENOUS at 22:30

## 2025-01-14 RX ADMIN — ACETAMINOPHEN 1000 MG: 500 TABLET ORAL at 17:07

## 2025-01-14 RX ADMIN — CEFAZOLIN 2 G: 2 INJECTION, POWDER, FOR SOLUTION INTRAMUSCULAR; INTRAVENOUS at 21:51

## 2025-01-14 RX ADMIN — KETOROLAC TROMETHAMINE 15 MG: 15 INJECTION, SOLUTION INTRAMUSCULAR; INTRAVENOUS at 11:25

## 2025-01-14 RX ADMIN — KETOROLAC TROMETHAMINE 15 MG: 15 INJECTION, SOLUTION INTRAMUSCULAR; INTRAVENOUS at 06:22

## 2025-01-14 RX ADMIN — OXYTOCIN 10 UNITS: 10 INJECTION, SOLUTION INTRAMUSCULAR; INTRAVENOUS at 00:36

## 2025-01-14 RX ADMIN — ACETAMINOPHEN 1000 MG: 500 TABLET ORAL at 22:30

## 2025-01-14 RX ADMIN — ACETAMINOPHEN 1000 MG: 500 TABLET ORAL at 03:26

## 2025-01-14 RX ADMIN — DOCUSATE SODIUM 100 MG: 100 CAPSULE, LIQUID FILLED ORAL at 15:23

## 2025-01-14 RX ADMIN — KETOROLAC TROMETHAMINE 30 MG: 15 INJECTION, SOLUTION INTRAMUSCULAR; INTRAVENOUS at 00:32

## 2025-01-14 RX ADMIN — CEFAZOLIN 2 G: 2 INJECTION, POWDER, FOR SOLUTION INTRAMUSCULAR; INTRAVENOUS at 13:43

## 2025-01-14 RX ADMIN — KETOROLAC TROMETHAMINE 15 MG: 15 INJECTION, SOLUTION INTRAMUSCULAR; INTRAVENOUS at 17:07

## 2025-01-14 RX ADMIN — ACETAMINOPHEN 1000 MG: 500 TABLET ORAL at 11:26

## 2025-01-14 RX ADMIN — MORPHINE SULFATE 2.5 MG: 0.5 INJECTION, SOLUTION EPIDURAL; INTRATHECAL; INTRAVENOUS at 00:00

## 2025-01-14 RX ADMIN — OXYCODONE 5 MG: 5 TABLET ORAL at 02:59

## 2025-01-14 RX ADMIN — SODIUM CHLORIDE, SODIUM GLUCONATE, SODIUM ACETATE, POTASSIUM CHLORIDE AND MAGNESIUM CHLORIDE: 526; 502; 368; 37; 30 INJECTION, SOLUTION INTRAVENOUS at 00:36

## 2025-01-14 RX ADMIN — ONDANSETRON 4 MG: 2 INJECTION INTRAMUSCULAR; INTRAVENOUS at 00:29

## 2025-01-14 RX ADMIN — POLYETHYLENE GLYCOL 3350 1 PACKET: 17 POWDER, FOR SOLUTION ORAL at 09:06

## 2025-01-14 ASSESSMENT — EDINBURGH POSTNATAL DEPRESSION SCALE (EPDS)
I HAVE FELT SAD OR MISERABLE: NOT VERY OFTEN
I HAVE BLAMED MYSELF UNNECESSARILY WHEN THINGS WENT WRONG: NOT VERY OFTEN
I HAVE FELT SCARED OR PANICKY FOR NO GOOD REASON: YES, SOMETIMES
I HAVE BEEN ANXIOUS OR WORRIED FOR NO GOOD REASON: YES, SOMETIMES
THINGS HAVE BEEN GETTING ON TOP OF ME: NO, MOST OF THE TIME I HAVE COPED QUITE WELL
I HAVE BEEN SO UNHAPPY THAT I HAVE BEEN CRYING: NO, NEVER
I HAVE BEEN ABLE TO LAUGH AND SEE THE FUNNY SIDE OF THINGS: AS MUCH AS I ALWAYS COULD
THE THOUGHT OF HARMING MYSELF HAS OCCURRED TO ME: NEVER
I HAVE LOOKED FORWARD WITH ENJOYMENT TO THINGS: AS MUCH AS I EVER DID
I HAVE BEEN SO UNHAPPY THAT I HAVE HAD DIFFICULTY SLEEPING: NOT AT ALL

## 2025-01-14 ASSESSMENT — PAIN DESCRIPTION - PAIN TYPE
TYPE: ACUTE PAIN
TYPE: SURGICAL PAIN
TYPE: ACUTE PAIN;SURGICAL PAIN
TYPE: ACUTE PAIN
TYPE: SURGICAL PAIN
TYPE: ACUTE PAIN;SURGICAL PAIN
TYPE: SURGICAL PAIN
TYPE: ACUTE PAIN

## 2025-01-14 ASSESSMENT — PAIN SCALES - GENERAL: PAIN_LEVEL: 0

## 2025-01-14 NOTE — PROGRESS NOTES
0202 Infant received to Room #312 rom L&D in MOB's arms, placed into open crib, ID bands checked x2, cuddles tag in place and blinking. Bedside report received from L&D RN Amina STERLING. Parents oriented to room, unit, POC, call light, feeding schedule, diapering, and infant safety and security; questions answered and parents verbalize understanding.  Pt assessment completed. No abnormal findings noted. All pt needs and questions addressed at this time.

## 2025-01-14 NOTE — PROGRESS NOTES
1145: Removed santa as ordered. Educated pt  need to void within 6 hrs a  Pt ambulates to the bathroom and room. Placed abdominal binder per pt request. Scheduled medication given. Care continuous.

## 2025-01-14 NOTE — CARE PLAN
The patient is Stable - Low risk of patient condition declining or worsening    Shift Goals  Clinical Goals: Vss, firm fundus, lochia wdL, control pain, ambulation, and removed santa  Patient Goals: Control pain and Bonding  Family Goals: Rest    Progress made toward(s) clinical / shift goals:    Problem: Pain - Standard  Goal: Alleviation of pain or a reduction in pain to the patient’s comfort goal  Description: Target End Date:  Prior to discharge or change in level of care    Document on Vitals flowsheet    1.  Document pain using the appropriate pain scale per order or unit policy  2.  Educate and implement non-pharmacologic comfort measures (i.e. relaxation, distraction, massage, cold/heat therapy, etc.)  3.  Pain management medications as ordered  4.  Reassess pain after pain med administration per policy  5.  If opiods administered assess patient's response to pain medication is appropriate per POSS sedation scale  6.  Follow pain management plan developed in collaboration with patient and interdisciplinary team (including palliative care or pain specialists if applicable)  Outcome: Progressing     Problem: Altered physiologic condition related to postoperative  delivery  Goal: Patient physiologically stable as evidenced by normal lochia, palpable uterine involution and vital signs within normal limits  Outcome: Progressing     Problem: Potential for postpartum infection related to surgical incision, compromised uterine condition, urinary tract or respiratory compromise  Goal: Patient will be afebrile and free from signs and symptoms of infection  Outcome: Progressing     Problem: Alteration in comfort related to surgical incision and/or after birth pains  Goal: Patient is able to ambulate, care for self and infant with acceptable pain level  Outcome: Progressing       Patient is not progressing towards the following goals:

## 2025-01-14 NOTE — ANESTHESIA PREPROCEDURE EVALUATION
Date: 25  Procedure: Labor Epidural         Relevant Problems   Other   (positive) History of back surgery   (positive) Previous  delivery affecting pregnancy   Pregnancy, labor pain    Physical Exam    Airway   Mallampati: II  TM distance: >3 FB  Neck ROM: full       Cardiovascular - normal exam  Rhythm: regular  Rate: normal  (-) murmur     Dental - normal exam           Pulmonary - normal exam  Breath sounds clear to auscultation     Abdominal    Neurological - normal exam                   Anesthesia Plan    ASA 2- EMERGENT   ASA physical status emergent criteria: non-reassuring fetal heart tones    Plan - epidural and general       Airway plan will be ETT    (Epidural for labor, not reliable for c/s.)            Pertinent diagnostic labs and testing reviewed    Informed Consent:    Anesthetic plan and risks discussed with patient.

## 2025-01-14 NOTE — PROGRESS NOTES
1900_ Assumed pt care. Report from CAYETANO Casillas. POC reviewed and understanding verbalized.    1959_ MALLORY Keller @ bedside. SVE 3/8/-2. Orders received to restart pitocin @ 2 milliunits/hr and go up by 1    2212_ PD for 8 minutes down to the 60's. MALLORY Cho @ bedside. Pt repositioned from right to left and then in hands and knees, pitocin off and IV fluids wide open. Dr Zeng called to bedside. Pt counseled for repeat c/s    2331_ Pt in OR 1    2345_ viable female delivered apgars 8/9    2346_ Placenta delivered M/I    0045_ Pt transferred to PACU in stable condition.    0150_ Pt transferred to PP with baby in stable condition. Report to CAYETANO Gonsales

## 2025-01-14 NOTE — ANESTHESIA PROCEDURE NOTES
Epidural Block    Date/Time: 1/13/2025 5:08 PM    Performed by: Chai House M.D.  Authorized by: Chai House M.D.    Patient Location:  OB  Start Time:  1/13/2025 5:08 PM  End Time:  1/13/2025 5:13 PM  Reason for Block: labor analgesia    patient identified, IV checked, site marked, risks and benefits discussed, surgical consent, monitors and equipment checked and pre-op evaluation    Patient Position:  Sitting  Prep: ChloraPrep, patient draped and sterile technique    Monitoring:  Blood pressure, continuous pulse oximetry and heart rate  Approach:  Midline  Location:  L4-L5  Injection Technique:  VERONICA saline  Skin infiltration:  Lidocaine  Strength:  1%  Dose:  2ml  Needle Type:  Tuohy  Needle Gauge:  17 G  Needle Length:  3.5 in  Loss of resistance::  5  Catheter Size:  19 G  Catheter at Skin Depth:  12  Test Dose Result:  Negative

## 2025-01-14 NOTE — PROGRESS NOTES
To room to assess patient for prolonged deceleration which ultimately responded by placing patient in hands and knees and stopping pitocin. EFM had demonstrated late decelerations, though with moderate variability and accelerations despite resuscitation. She has not made any cervical change since . At this time, I recommend proceeding with  section for nonreassuring fetal status remote from delivery. Patient initially requested delay to discuss with her . Ultimately called myself and CAYETANO Duarte back to room to review plan, and subsequently agreed to proceed with  section.    The risks, benefits, complications, and alternatives were discussed with the patient. The patient understood that the risks of  section include, but are not limited to: injury to nearby structures or organs, infection, blood loss and possible need for transfusion, and potential need for more surgery including hysterectomy. The patient stated understanding and desired to proceed. All questions were answered.   Delphine Zeng MD MPH

## 2025-01-14 NOTE — PROGRESS NOTES
PostpartumCS Progress Note   1/14/2025, 6:05 AM       S: Patient without complaints.  Pain is adequately controlled.  Patient is tolerating diet, ambulating and urinating without difficulty.  No CP/SOB.  No N/V.  Lochia appropriate.  Not yet passing flatus.         O:   Vitals:    01/14/25 0300 01/14/25 0403 01/14/25 0500 01/14/25 0549   BP:    107/57   Pulse: 88 68 66 65   Resp: 18 18 18 18   Temp:    36.3 °C (97.4 °F)   TempSrc:    Temporal   SpO2: 96% 94% 95% 95%   Weight:       Height:            GEN: NAD, speaking full sentences without distress  HEENT: NCAT, PERRLA, moist oral mucosa, anicteric, without pallor  CVS: Extremities warm and well perfused  LUNGS: Unlabored breathing  ABD: Soft, gravid, nontender, nondistended  Inc: clean/dry/intact with Mepilex in place   BACK: No CVAT  EXT: No cyanosis or edema  NEURO: No focal deficits        Intake/Output Summary (Last 24 hours) at 1/14/2025 0630  Last data filed at 1/14/2025 0202  Gross per 24 hour   Intake 2670.49 ml   Output 2000 ml   Net 670.49 ml       Labs:   Component      Latest Ref Rng 1/13/2025 1/14/2025   WBC      4.8 - 10.8 K/uL 9.5  24.2 (H)    RBC      4.20 - 5.40 M/uL 3.97 (L)  3.44 (L)    Hemoglobin      12.0 - 16.0 g/dL 12.9  11.1 (L)    Hematocrit      37.0 - 47.0 % 37.2  32.9 (L)    MCV      81.4 - 97.8 fL 93.7  95.6    MCH      27.0 - 33.0 pg 32.5  32.3    MCHC      32.2 - 35.5 g/dL 34.7  33.7    RDW      35.9 - 50.0 fL 45.3  46.1    Platelet Count      164 - 446 K/uL 214  197    MPV      9.0 - 12.9 fL 10.3  9.9    Neutrophils-Polys      44.00 - 72.00 % 70.80  90.70 (H)    Lymphocytes      22.00 - 41.00 % 21.50 (L)  4.60 (L)    Monocytes      0.00 - 13.40 % 6.70  3.70    Eosinophils      0.00 - 6.90 % 0.50  0.20    Basophils      0.00 - 1.80 % 0.20  0.20    Immature Granulocytes      0.00 - 0.90 % 0.30  0.60    Nucleated RBC      0.00 - 0.20 /100 WBC 0.00  0.00    Neutrophils (Absolute)      1.82 - 7.42 K/uL 6.70  21.95 (H)    Lymphs  (Absolute)      1.00 - 4.80 K/uL 2.04  1.11    Monos (Absolute)      0.00 - 0.85 K/uL 0.63  0.90 (H)    Eos (Absolute)      0.00 - 0.51 K/uL 0.05  0.04    Baso (Absolute)      0.00 - 0.12 K/uL 0.02  0.04    Immature Granulocytes (abs)      0.00 - 0.11 K/uL 0.03  0.15 (H)    NRBC (Absolute)      K/uL 0.00  0.00       Legend:  (L) Low  (H) High       A/P: Kelly Alcocer is a 29 y.o.  POD#1 s/p rCS under GETA following failed IOL/NRFS remote from delivery, complicated by uterine rupture, intraoperative hemorrhage.     1.  Afebrile, vitals signs stable, pain controlled   2.  Continue routine post-op/PP care.  - Given splash prep at time of delivery: Plan for Ancef ppx x24h postpartum   3.  breast feeding   4.  Birth control: Interval NuvaRing (4w postpartum)   5.  Labs: Rh+/RI, H/H with appropriate drop. Patient agreeable to blood transfusion in event that PM CBC demonstrates acute blood loss anemia, or if she is symptomatic with exertion.  6.  Encourage ambulation and incentive spirometry   7.  Discussion this morning and overnight in PACU with patient about nature of delivery and intraoperative findings of uterine rupture. She is not a candidate for TOLAC in the future. Recommend scheduled pre-labor  section (36-37w) in future pregnancies.    Anticipate discharge POD#3 or 4        Delphine Zeng MD MPH

## 2025-01-14 NOTE — CARE PLAN
Problem: Potential knowledge deficit related to lack of understanding of self and  care  Goal: Patient will verbalize understanding of self and infant care  Outcome: Progressing   The patient is Stable - Low risk of patient condition declining or worsening    Shift Goals  Clinical Goals: VSS  Patient Goals: N/A  Family Goals: rest, bonding    Progress made toward(s) clinical / shift goals:  Pt is attentive and receptive to infant needs. MOB educated on feed times for infant. MOB voices understanding.

## 2025-01-14 NOTE — L&D DELIVERY NOTE
OB  Delivery Note    2025  Kelly Alcocer  29 y.o.    Review the Delivery Report for details.     Gestational Age: 41w2d  /Para:   Labor Complications: Fetal Intolerance  Estimated Blood Loss:   Delivery Blood Loss   Intrapartum & Postpartum: 25 2343 - 25 0048    Delivery Admission: 25 0926 - 25 0048         Intrapartum & Postpartum Delivery Admission    Est. Blood Loss Anesthesia 1000 mL 1000 mL    Total  1000 mL 1000 mL          Delivery Type: , Low Transverse  ROM to Delivery Time: 10h 35m   Sex: Female  [place] Weight: 3.44 kg (7 lb 9.3 oz)   1 Minute 5 Minute 10 Minute   Apgar Totals: 8   9           Details    Pre-Op Diagnosis: 1. Intrauterine pregnancy at 41w2d  2. History of  section  3. Scheduled induction of labor  4. Fetal intolerance of labor   Delivery Justification Patient was admitted to Labor and Delivery at 41w2d for induction of labor due to postdates   Post-Op Diagnosis: 1-4. Same  5. Uterine rupture   Indications:  Fetal Intolerance of labor   Procedure: Repeat , Low Transverse via Low Transverse incision   Staff Surgeon(s):  SULMA Martinez Nicholas, MD Assist  Circulator: Amina Duarte R.N.  Scrub Person: Brandie Martinez   Anesthesia: Epidural;General   Findings: Female infant delivered, weighing 3.44 kg (7 lb 9.3 oz). Right sided uterine rupture with extrusion of right fetal cheek, shoulder. Normal uterus, tubes, and ovaries, filmy adhesion between left adnexa and side wall.     Informed Consent:  The risks, benefits, complications, and alternatives were discussed with the patient. The patient understood that the risks of  section include, but are not limited to: injury to nearby structures or organs, infection, blood loss and possible need for transfusion, and potential need for more surgery including hysterectomy. The patient stated understanding and desired to proceed.  All questions were answered. The site of surgery was properly noted and marked. The patient was identified as Kelly Alcocer and the procedure verified as a  delivery. A Time Out was held and the above information confirmed.        Procedure in Detail:    Prior to delivery, decision was made by Dr. House to proceed with PATRICK given history of poor pain relief with epidural in previous  section. A Tobias catheter had already been inserted with sterile technique. She was given 2g Ancef and 500mg Azithromycin preoperatively for prophylaxis. She was placed in the supine position with a leftward tilt and SCD stockings were placed and a time-out was performed. Fetal HR noted to be in the 90s upon spot check, so splash prep performed.     A low transverse Pfannenstiel skin incision was made using a scalpel approximately 2 cm above the symphysis pubis. This was carried down sharply to the underlying layer of fascia with the knife. The fascia was nicked with the knife and the inferior and superior portions  with blunt stretching. The rectus muscles were then  bluntly. The parietal peritoneum was entered bluntly and the peritoneal incision was extended by sharp dissection and blunt stretching. The Ja retractor was inserted. Upon entry, ~3.5cm right sided uterine rupture identified with extrusion of right fetal cheek and shoulder. The site of uterine rupture was extended with blunt dissection and the fetal head was elevated to the level of the hysterotomy.     The infant's head delivered atraumatically followed by the remainder of the body with fundal pressure.   The cord clamped and cut after a delay of 30s and the infant was handed to awaiting pediatric staff. The placenta was then removed manually, the uterus cleared of all clots and debris. The uterine incision was repaired with 0 Vicryl in a running locked fashion.Two additional imbricating layers were placed over the level of  the hysterotomy to achieve hemostasis. The paracolic gutters were cleared of all clots. A final look at the uterine incision revealed excellent hemostasis. Ja retractor was removed. The hysterotomy was again visualized and excellent hemostasis noted.    The fascia was reapproximated with 0 Vicryl in a running fashion starting on one end and going to be other. Irrigation of the subcutaneous tissue was performed. The subcutaneous fat was closed with 0 Vicryl. The skin was closed with a subcuticular stitch and a Mepilex dressing was applied. The patient tolerated the procedure well. Sponge, lap and needle counts were correct x2 and the patient was taken to the recovery room in stable condition.        Given decision to proceed with splash prep, plan for Ancef x24h for antibiotic ppx      Delphine Zeng MD MPH

## 2025-01-14 NOTE — PROGRESS NOTES
Assumed care of patient at shift change.    Assessment completed. Fundus is firm, scant with light lochia. Pt reports 3/10 pain at this time, per pt pain is tolerable.  Sx dressing CDI. Bed is locked and in low position, call light left within reach and encouraged to call for any needs if necessary. SO at bedside and  plan of care discussed  in both. Patient education  instructions provided.  All questions are answered. Care continuous.

## 2025-01-14 NOTE — ANESTHESIA POSTPROCEDURE EVALUATION
Patient: Kelly Alcocer    Procedure Summary       Date: 25 Room / Location: [facility] OR 01 / SURGERY LABOR AND DELIVERY    Anesthesia Start: 170 Anesthesia Stop: 25    Procedure:  SECTION, REPEAT (Abdomen) Diagnosis: (same delivered)    Surgeons: Delphine Zeng M.D. Responsible Provider: Chai House M.D.    Anesthesia Type: epidural ASA Status: 2 - Emergent            Final Anesthesia Type: epidural  Last vitals  BP   Blood Pressure: 103/56    Temp   36.9 °C (98.5 °F)    Pulse   80   Resp   17    SpO2   94 %      Anesthesia Post Evaluation    Patient location during evaluation: PACU  Patient participation: complete - patient participated  Level of consciousness: awake and alert  Pain score: 0    Airway patency: patent  Anesthetic complications: no  Cardiovascular status: hemodynamically stable  Respiratory status: acceptable  Hydration status: euvolemic    PONV: none          No notable events documented.     Nurse Pain Score: 0 (NPRS)

## 2025-01-14 NOTE — PROGRESS NOTES
"LABOR AND DELIVERY INTRAPARTUM PROGRESS NOTE     Reports ongoing abdominal pain, not relieved with epidural. Describes pain as a diffuse muscle ache, \"like I'm bruised everywhere.\" Anesthesia called to bedside to assess/re-bolus epidural     /67   Pulse 77   Temp 36.9 °C (98.5 °F) (Temporal)   Resp 16   Ht 1.702 m (5' 7\")   Wt 86.2 kg (190 lb)   LMP 2024 (Exact Date)   SpO2 98%   BMI 29.76 kg/m²       GEN: NAD, speaking full sentences without distress  HEENT: NCAT, PERRLA, moist oral mucosa, anicteric, without pallor  CVS: Extremities warm and well perfused  LUNGS: Unlabored breathing  ABD: Soft, gravid, nontender, nondistended  BACK: No CVAT  EXT: No cyanosis or edema  NEURO: No focal deficits      CERVIX: deferred while awaiting anesthesia assessment     NST: baseline 145 bpm, moderate variability, + accels, no decels    ASSESSMENT AND PLAN:   29 y.o.  with history of  section, who at 41w1d is admitted for IOL/TOLAC.    1. Labor: Latent labor, COOK balloon removed after SROM. Pitocin started at 1515 and subsequently stopped at 1748 at patient request while awaiting epidural placement.  Pain management: Epidural in place, reassessment by Dr. Moya in process   2. Fetal well being: Cat 1          Delphine Zeng MD MPH    "

## 2025-01-14 NOTE — ANESTHESIA TIME REPORT
Anesthesia Start and Stop Event Times       Date Time Event    1/13/2025 1658 Ready for Procedure     1708 Anesthesia Start    1/14/2025 0047 Anesthesia Stop          Responsible Staff  01/13/25 to 01/14/25      Name Role Begin End    Chai House M.D. Anesth 1708 0047          Overtime Reason:  no overtime (within assigned shift)    Comments:

## 2025-01-14 NOTE — PROGRESS NOTES
"S: Patient is comfortable with an epidural.      O: /65   Pulse 76   Temp 36.8 °C (98.2 °F) (Temporal)   Resp 16   Ht 1.702 m (5' 7\")   Wt 86.2 kg (190 lb)   LMP 2024 (Exact Date)   SpO2 98%   BMI 29.76 kg/m²            FHTs:  Baseline 145, variability: moderate, accels: present, decels:early, variables        [place]: Contractions q 2-3 min        SVE: 3/80/-2        IUPC declined      A/P:    1.  IUP @ 41.1  2.  Cat 2 FHTs    3.  start pitocin at 2 munits  4.  Anticipate   5.  Maternal position changes    Tammie Bell CNM    "

## 2025-01-15 ENCOUNTER — TELEPHONE (OUTPATIENT)
Dept: OBGYN | Facility: CLINIC | Age: 30
End: 2025-01-15
Payer: MEDICAID

## 2025-01-15 LAB
ERYTHROCYTE [DISTWIDTH] IN BLOOD BY AUTOMATED COUNT: 47.4 FL (ref 35.9–50)
HCT VFR BLD AUTO: 25.5 % (ref 37–47)
HGB BLD-MCNC: 8.6 G/DL (ref 12–16)
MCH RBC QN AUTO: 32.5 PG (ref 27–33)
MCHC RBC AUTO-ENTMCNC: 33.7 G/DL (ref 32.2–35.5)
MCV RBC AUTO: 96.2 FL (ref 81.4–97.8)
PLATELET # BLD AUTO: 148 K/UL (ref 164–446)
PMV BLD AUTO: 10 FL (ref 9–12.9)
RBC # BLD AUTO: 2.65 M/UL (ref 4.2–5.4)
WBC # BLD AUTO: 16.3 K/UL (ref 4.8–10.8)

## 2025-01-15 PROCEDURE — 770002 HCHG ROOM/CARE - OB PRIVATE (112)

## 2025-01-15 PROCEDURE — A9270 NON-COVERED ITEM OR SERVICE: HCPCS

## 2025-01-15 PROCEDURE — 36415 COLL VENOUS BLD VENIPUNCTURE: CPT

## 2025-01-15 PROCEDURE — 700102 HCHG RX REV CODE 250 W/ 637 OVERRIDE(OP)

## 2025-01-15 PROCEDURE — 85027 COMPLETE CBC AUTOMATED: CPT

## 2025-01-15 PROCEDURE — A9270 NON-COVERED ITEM OR SERVICE: HCPCS | Performed by: STUDENT IN AN ORGANIZED HEALTH CARE EDUCATION/TRAINING PROGRAM

## 2025-01-15 PROCEDURE — 700102 HCHG RX REV CODE 250 W/ 637 OVERRIDE(OP): Performed by: STUDENT IN AN ORGANIZED HEALTH CARE EDUCATION/TRAINING PROGRAM

## 2025-01-15 RX ADMIN — SIMETHICONE 125 MG: 125 TABLET, CHEWABLE ORAL at 22:22

## 2025-01-15 RX ADMIN — IBUPROFEN 800 MG: 800 TABLET, FILM COATED ORAL at 05:52

## 2025-01-15 RX ADMIN — SIMETHICONE 125 MG: 125 TABLET, CHEWABLE ORAL at 17:29

## 2025-01-15 RX ADMIN — POLYETHYLENE GLYCOL 3350 1 PACKET: 17 POWDER, FOR SOLUTION ORAL at 09:01

## 2025-01-15 RX ADMIN — ACETAMINOPHEN 1000 MG: 500 TABLET ORAL at 17:27

## 2025-01-15 RX ADMIN — IBUPROFEN 800 MG: 800 TABLET, FILM COATED ORAL at 14:16

## 2025-01-15 RX ADMIN — SIMETHICONE 125 MG: 125 TABLET, CHEWABLE ORAL at 05:52

## 2025-01-15 RX ADMIN — ACETAMINOPHEN 1000 MG: 500 TABLET ORAL at 05:53

## 2025-01-15 RX ADMIN — ACETAMINOPHEN 1000 MG: 500 TABLET ORAL at 11:34

## 2025-01-15 RX ADMIN — SIMETHICONE 125 MG: 125 TABLET, CHEWABLE ORAL at 11:34

## 2025-01-15 RX ADMIN — IBUPROFEN 800 MG: 800 TABLET, FILM COATED ORAL at 22:21

## 2025-01-15 ASSESSMENT — PAIN DESCRIPTION - PAIN TYPE
TYPE: ACUTE PAIN;SURGICAL PAIN
TYPE: ACUTE PAIN
TYPE: ACUTE PAIN;SURGICAL PAIN

## 2025-01-15 NOTE — CARE PLAN
The patient is Stable - Low risk of patient condition declining or worsening    Shift Goals  Clinical Goals: VSS, fundus firm with light lochia, pain control  Patient Goals: Control pain and Bonding  Family Goals: Rest    Progress made toward(s) clinical / shift goals:    Problem: Altered physiologic condition related to postoperative  delivery  Goal: Patient physiologically stable as evidenced by normal lochia, palpable uterine involution and vital signs within normal limits  Outcome: Progressing  Note: Fundus firm with light lochia, patient voiding well.      Problem: Potential knowledge deficit related to lack of understanding of self and  care  Goal: Patient will verbalize understanding of self and infant care  Outcome: Progressing  Note: MOB bonding well with baby, providing all cares at this time, calls for assistance as needed.        Patient is not progressing towards the following goals:

## 2025-01-15 NOTE — PROGRESS NOTES
Obstetrics & Gynecology Post-Delivery Progress Note    Date of Service  1/15/2025    29 y.o.  s/p  for fetal distress, uterine rupture  Delivery date: 2025    Hospital Course  Pt was admitted for a post-dates IOL for an attempted TOLAC. Her pregnancy was complicated by previous CS for failure to progress and hx back surgery. Her labor course was complicated by a uterine rupture and fetal intolerance of labor. EBL was approximately 1000mL. Postpartum course has been unremarkable and pt reports pain is well controlled with medications. Pt strongly desires discharge today.    Subjective  Pain: Yes,  controlled  Bleeding: lochia minimal  PO's: taking regular diet  Voiding: without difficulty  Ambulating: yes  Passing flatus: Yes  Feeding: breastfeeding well    Objective  Temp:  [36.2 °C (97.1 °F)-36.6 °C (97.9 °F)] 36.2 °C (97.2 °F)  Pulse:  [64-84] 70  Resp:  [16-20] 17  BP: ()/(45-66) 102/56  SpO2:  [93 %-99 %] 99 %    Physical Exam  General: well  Chest/Breasts: nipples intact   Abdomen: non-distended  Fundus: below umbilicus  Incision: dressing clean, dry, intact  Perineum: deferred  Extremities: 1+ edema, calves nontender    Recent Labs     25  1220 25  0249 25  1809   WBC 9.5 24.2* 21.4*   RBC 3.97* 3.44* 2.82*   HEMOGLOBIN 12.9 11.1* 9.0*   HEMATOCRIT 37.2 32.9* 27.1*   MCV 93.7 95.6 96.1   MCH 32.5 32.3 31.9   RDW 45.3 46.1 45.8   PLATELETCT 214 197 174   MPV 10.3 9.9 10.1   NEUTSPOLYS 70.80 90.70* 81.30*   LYMPHOCYTES 21.50* 4.60* 11.50*   MONOCYTES 6.70 3.70 6.40   EOSINOPHILS 0.50 0.20 0.10   BASOPHILS 0.20 0.20 0.10       Assessment/Plan  Kelly Alcocer is a 29 y.o.yo  s/p postop day #1  s/p  for fetal distress and uterine rupture    1. Post care: meeting all goals  2. Hemodynamics:  12.9 > 11.1 > 9.0; will repeat CBC to trend  3. Pain: controlled  4. Rh+, Rubella Immune  5. Method of Feeding: plans to breastfeed  6. Method of Contraception:   NuvaRing  7. Uterine rupture: will trend CBC, monitor pain  8. Disposition: likely home postop day 3; we discussed recommendation to stay for 48 hours after CS, especially considering her complications during labor.     VTE prophylaxis: Marilyn

## 2025-01-15 NOTE — PROGRESS NOTES
Report received. Assumed care. Pt in bed awake. A/O x4. VSS. Responds appropriately. Denies pain, SOB. Assessment complete. Fundus firm, lochia light. Silver mepilex to lower abdomen in place, cdi. Discussed POC, , pt verbalizes understanding. Call light and belongings within reach.  Bed in the lowest position. Treaded socks in place. Hourly rounding in progress. Will continue to monitor .

## 2025-01-15 NOTE — CARE PLAN
The patient is Stable - Low risk of patient condition declining or worsening    Shift Goals  Clinical Goals: stable VS, fundus and lochia light, pain control  Patient Goals: pain control, rest  Family Goals: bonding and support    Progress made toward(s) clinical / shift goals:    Problem: Knowledge Deficit - Standard  Goal: Patient and family/care givers will demonstrate understanding of plan of care, disease process/condition, diagnostic tests and medications  Outcome: Progressing  Note: Educated pt on POC, monitor VS, pain control, NB care, mobility, safety, all questions answered, hourly rounding in progress     Problem: Pain - Standard  Goal: Alleviation of pain or a reduction in pain to the patient’s comfort goal  Outcome: Progressing  Note: Medicated pt with tylenol and Simethicon per MAR with adequate pain control per pt report, resting in bed comfortably, hourly rounding in progress     Problem: Altered physiologic condition related to postoperative  delivery  Goal: Patient physiologically stable as evidenced by normal lochia, palpable uterine involution and vital signs within normal limits  Outcome: Progressing  Note: VSS, fundus firm, lochia light, hourly rounding in progress       Patient is not progressing towards the following goals:

## 2025-01-15 NOTE — PROGRESS NOTES
Bedside report received from CAYETANO Brown. Pt in bed resting comfortably at this time. Pt able to get up to restroom and void independently, denies need for pain medication at this time. Pt states that she will call if additional pain medication is needed. Whiteboard updated. POC discussed. Call light within reach. All questions and concerns answered at this time, advised to call for assistance as needed.

## 2025-01-15 NOTE — TELEPHONE ENCOUNTER
Unable to lvm for patient to cb and schedule PP visit- her VM is not set up yet.Patient delivered 1 13 25 // DHEERAJ

## 2025-01-15 NOTE — LACTATION NOTE
This note was copied from a baby's chart.  Initial Visit:    Mom is a P2 who delivered her second baby at 41.1 weeks gestation weighing 7 # 9.3 oz  Mo has a 3 yr old    Risk factor for breastfeeding is:   No risk factors    History of BF: Mom states she has been breast feeding on demand but has also offered formula. She did not make a good supply with first.       Current Breastfeeding Status: Mom asked for breast feeding help today.  LC came to room for next feeding and mom reports that baby did not wake for a feeding so she offered baby 5mls of a bottle.   LC offered assistance with latching and positioning baby at breast .    Breastfeeding Assistance: Mom had breast ready on right side but baby was swaddled and sleeping cradled in her arms. LC took baby's bundle off and placed tummy to tummy with ear, shoulder and hip aligned. Baby woke and latched independently.   LC reviewed demand feeds of 8 or more times in 24 hours, discussed supply and demand and to  avoid bottles pumps and pacifiers unless medically indicated. LC provided information on [employer] video.  Baby continued to feed while LC educated mom over the next 15 minutes. LC encourage mom to burp baby after feeding and offer second breast.  Mom is to call for help as needed.    Mom has a pump at home and is not enrolled in [place] yet. She is scheduled in [facility]      MOB verbalized understanding of all information provided to her and denied having any lactation questions and/or concerns at this time.

## 2025-01-16 ENCOUNTER — PHARMACY VISIT (OUTPATIENT)
Dept: PHARMACY | Facility: MEDICAL CENTER | Age: 30
End: 2025-01-16
Payer: COMMERCIAL

## 2025-01-16 VITALS
DIASTOLIC BLOOD PRESSURE: 67 MMHG | HEIGHT: 67 IN | OXYGEN SATURATION: 98 % | RESPIRATION RATE: 16 BRPM | WEIGHT: 190 LBS | BODY MASS INDEX: 29.82 KG/M2 | SYSTOLIC BLOOD PRESSURE: 119 MMHG | TEMPERATURE: 98.5 F | HEART RATE: 78 BPM

## 2025-01-16 PROBLEM — S37.69XA UTERINE RUPTURE: Chronic | Status: RESOLVED | Noted: 2025-01-14 | Resolved: 2025-01-16

## 2025-01-16 PROBLEM — Z34.90 ENCOUNTER FOR INDUCTION OF LABOR: Status: RESOLVED | Noted: 2025-01-13 | Resolved: 2025-01-16

## 2025-01-16 PROCEDURE — 700102 HCHG RX REV CODE 250 W/ 637 OVERRIDE(OP)

## 2025-01-16 PROCEDURE — A9270 NON-COVERED ITEM OR SERVICE: HCPCS | Performed by: STUDENT IN AN ORGANIZED HEALTH CARE EDUCATION/TRAINING PROGRAM

## 2025-01-16 PROCEDURE — A9270 NON-COVERED ITEM OR SERVICE: HCPCS

## 2025-01-16 PROCEDURE — 700102 HCHG RX REV CODE 250 W/ 637 OVERRIDE(OP): Performed by: STUDENT IN AN ORGANIZED HEALTH CARE EDUCATION/TRAINING PROGRAM

## 2025-01-16 PROCEDURE — RXMED WILLOW AMBULATORY MEDICATION CHARGE

## 2025-01-16 RX ORDER — SODIUM CHLORIDE, SODIUM LACTATE, POTASSIUM CHLORIDE, CALCIUM CHLORIDE 600; 310; 30; 20 MG/100ML; MG/100ML; MG/100ML; MG/100ML
2000 INJECTION, SOLUTION INTRAVENOUS PRN
Status: DISCONTINUED | OUTPATIENT
Start: 2025-01-16 | End: 2025-01-16 | Stop reason: HOSPADM

## 2025-01-16 RX ORDER — PSEUDOEPHEDRINE HCL 30 MG
100 TABLET ORAL 2 TIMES DAILY PRN
Qty: 60 CAPSULE | Refills: 1 | Status: SHIPPED | OUTPATIENT
Start: 2025-01-16

## 2025-01-16 RX ORDER — IBUPROFEN 800 MG/1
TABLET, FILM COATED ORAL
Qty: 120 TABLET | Refills: 1 | Status: SHIPPED | OUTPATIENT
Start: 2025-01-16 | End: 2025-03-17

## 2025-01-16 RX ORDER — OXYCODONE HYDROCHLORIDE 5 MG/1
5 TABLET ORAL EVERY 4 HOURS PRN
Status: DISCONTINUED | OUTPATIENT
Start: 2025-01-17 | End: 2025-01-16

## 2025-01-16 RX ORDER — FERROUS SULFATE 325(65) MG
325 TABLET ORAL DAILY
Qty: 60 TABLET | Refills: 2 | Status: SHIPPED | OUTPATIENT
Start: 2025-01-16

## 2025-01-16 RX ADMIN — POLYETHYLENE GLYCOL 3350 1 PACKET: 17 POWDER, FOR SOLUTION ORAL at 06:23

## 2025-01-16 RX ADMIN — ACETAMINOPHEN 1000 MG: 500 TABLET ORAL at 00:35

## 2025-01-16 RX ADMIN — SIMETHICONE 125 MG: 125 TABLET, CHEWABLE ORAL at 11:45

## 2025-01-16 RX ADMIN — ACETAMINOPHEN 1000 MG: 500 TABLET ORAL at 06:24

## 2025-01-16 RX ADMIN — IBUPROFEN 800 MG: 800 TABLET, FILM COATED ORAL at 06:24

## 2025-01-16 ASSESSMENT — PAIN DESCRIPTION - PAIN TYPE
TYPE: ACUTE PAIN;SURGICAL PAIN
TYPE: ACUTE PAIN;SURGICAL PAIN

## 2025-01-16 NOTE — PROGRESS NOTES
2000- Assessment completed. Pt denies of pain at this time. Will notify this RN when needing pain interventions. Fundus firm, lochia scant. VS Stable. POC reviewed with MOB: pain control, lochia, hydration, and ambulation. Verbalized understanding. No further questions at this time. Patient educated on emergency call light. Call light within reach.

## 2025-01-16 NOTE — PROGRESS NOTES
0705- Bedside report received from CAYETANO Berrios.  Patient denied needs.  0855- Patient denied needs.  Patient stated desire for discharge home today and was encouraged to read the written patient education/instruction sheet.  1105- Patient assessment done.  Patient reported she is voiding without difficulty and passing flatus.  Patient denied dizziness and reported that she is walking without difficulty.  Reviewed plan of care.  Patient verbalized understanding.  FOB at bedside.  1210- Patient stated she read the written patient education/instruction sheet and has no questions.  Discharge instructions reviewed with patient who verbalized understanding and stated she has no questions.  Discharge paperwork signed by patient.  Patient received her discharge medications from the outpatient pharmacist.  Patient will call when ready for escort out to the vehicle.  1256- Patient stated that she is ready for discharge.  Patient declined offer for wheelchair escort and was discharged to home, no change noted in condition, with infant and FOB.

## 2025-01-16 NOTE — LACTATION NOTE
This note was copied from a baby's chart.  Follow up:    MOB had questions about how to achieve deeper latch.  With permission assisted with latch in cross cradle and taught techniques to be able to achieve independently.  Baby latched immediately with swallows, MOB's breasts are full and transitional milk expressed.  MOB expressed increased comfort with latch.     Discussed supply and demand of breastmilk production and if bottle supplementation is desired, continue to latch at breast at least 8x in 24hrs to produce supply.  Highly encouraged to attend a breastfeeding group or follow up with WIC.     Breastfeeding Plan:      Continue to breastfeed on demand at least 8x in 24hrs. Wake baby and breastfeed if last feed was 3.5hrs prior.  Anticipate clusterfeeding and baby may feed more frequent and for longer periods of time.  This frequent feeding is activating hormones to make mature breastmilk.  Breastmilk should increase in volume by postpartum day 4, followed with increased diaper output.  Continue frequent skin to skin while MOB awake and attentive.     Create a comfortable and relaxing environment for breastfeeding, minimizing distractions and ensuring proper positioning for mom and baby.

## 2025-01-16 NOTE — DISCHARGE INSTRUCTIONS
PATIENT DISCHARGE EDUCATION INSTRUCTION SHEET    REASONS TO CALL YOUR OBSTETRICIAN  Persistent fever, shaking, chills (Temperature higher than 100.4) may indicate you have an infection  Heavy bleeding: soaking more than 1 pad per hour; Passing clots an egg-sized clot or bigger may mean you have an postpartum hemorrhage  Foul odor from vagina or bad smelling or discolored discharge or blood  Breast infection (Mastitis symptoms); breast pain, chills, fever, redness or red streaks, may feel flu like symptoms  Urinary pain, burning or frequency  Incision that is not healing, increased redness, swelling, tenderness or pain, or any pus from episiotomy or  site may mean you have an infection  Redness, swelling, warmth, or painful to touch in the calf area of your leg may mean you have a blood clot  Severe or intensified depression, thoughts or feelings of wanting to hurt yourself or someone else   Pain in chest, obstructed breathing or shortness of breath (trouble catching your breath) may mean you are having a postpartum complication. Call your provider immediately   Headache that does not get better, even after taking medicine, a bad headache with vision changes or pain in the upper right area of your belly may mean you have high blood pressure or post birth preeclampsia. Call your provider immediately    HAND WASHING  All family and friends should wash their hands:  Before and after holding the baby  Before feeding the baby  After using the restroom or changing the baby's diaper    WOUND CARE  Ask your physician for additional care instructions. In general:   Incision:  May shower and pat incision dry   Keep the incision clean and dry  There should not be any opening or pus from the incision  Continue to walk at home 3 times a day   Do NOT lift anything heavier than your baby (over 10 pounds)  Encourage family to help participate in care of the  to allow rest and mom time to heal    VAGINAL CARE  "AND BLEEDING  Nothing inside vagina for 6 weeks:   No sexual intercourse, tampons or douching  Bleeding may continue for 2-4 weeks. Amount and color may vary  Soaking 1 pad or more in an hour for several hours is considered heavy bleeding  Passing large egg sized blood clots can be concerning  If you feel like you have heavy bleeding or are having increasing amount of blood clots call your Obstetrician immediately  If you begin feeling faint upon standing, feeling sick to your stomach, have clammy skin, a really fast heartbeat, have chills, start feeling confused, dizzy, sleepy or weak, or feeling like you're going to faint call your Obstetrician immediately    HYPERTENSION   Preeclampsia or gestational hypertension are types of high blood pressure that only pregnant women can get. It is important for you to be aware of symptoms to seek early intervention and treatment. If you have any of these symptoms immediately call your Obstetrician    Vision changes or blurred vision   Severe headache or pain that is unrelieved with medication and will not go away  Persistent pain in upper abdomen or shoulder   Increased swelling of face, feet, or hands  Difficulty breathing or shortness of breath at rest  Urinating less than usual    URINATION AND BOWEL MOVEMENTS  Eating more fiber (bran cereal, fruits, and vegetables) and drinking plenty of fluids will help to avoid constipation  Urinary frequency and urgency after childbirth is normal  If you experience any urinary pain, burning or frequency call your provider    BIRTH CONTROL  It is possible to become pregnant at any time after delivery and while breastfeeding  Plan to discuss a method of birth control with your physician at your post delivery follow up visit    POSTPARTUM BLUES  During the first few days after birth, you may experience a sense of the \"blues\" which may include impatience, irritability or even crying. These feelings come and go quickly. However, as many as " "1 in 10 women experience emotional symptoms known as postpartum depression.     POSTPARTUM DEPRESSION  May start as early as the second or third day after delivery or take several weeks or months to develop. Symptoms of \"blues\" are present, but are more intense: Crying spells; loss of appetite; feelings of hopelessness or loss of control; fear of touching the baby; over concern or no concern at all about the baby; little or no concern about your own appearance/caring for yourself; and/or inability to sleep or excessive sleeping. Contact your Obstetrician if you are experiencing any of these symptoms     PREVENTING SHAKEN BABY  If you are angry or stressed, PUT THE BABY IN THE CRIB, step away, take some deep breaths, and wait until you are calm to care for the baby. DO NOT SHAKE THE BABY. You are not alone, call a supporter for help.  [facility] 24/7 crisis call line ([phone]) or (1-778.167.4391)  You can also text them, text \"ANSWER\" ([id number])  "

## 2025-01-16 NOTE — DISCHARGE SUMMARY
[facility]  Obstetrics Discharge Summary    Date of Admission: 2025  Date of Discharge: 25    Admitting diagnosis:    1. Pregnancy at 41w1d  2. Induction of labor and trial of labor after     Discharge Diagnosis:   1. Status post  for repeat and fetal intolerance of labor.  2. Uterine rupture  3. Post partum hemorrhage  4. Acute blood loss anemia    Hospital Course:   Pt is 29 y.o. now  who presented on 2025 for IOL and TOLAC.   Labor induction with Cook balloon, augmentation performed with pitocin.   Epidural anesthesia was utilized with good effect on pain.   Patient progressed to a  after prolonged decelerations were noted.  Delivery was complicated by uterine rupture and post partum hemorrhage with EBL 1.5L. She had a drop in hemoglobin from 12.9 to 9.0 with repeat 8.6.    Patient had early ambulation, well managed pain, tolerance of diet, spontaneous voiding, and appropriate feeding of infant.   She has remained afebrile and blood pressure has been well controlled.   All maternal questions and concerns addressed.    Single female infant was delivered via LTCS on 25 at 2345 with APGARs 8 and 9 at 1 and 5 minutes respectively.   EBL 1.5L    PHYSICAL EXAM:  Temp:  [36.2 °C (97.1 °F)-36.6 °C (97.9 °F)] 36.4 °C (97.5 °F)  Pulse:  [66-79] 66  Resp:  [16-20] 16  BP: ()/(60-65) 99/60  SpO2:  [95 %-100 %] 95 %    GEN: well appearing, no apparent distress  CV: +S1S2, RRR, mild BLE edema  RESP: CTAB, breathing comfortably on RA  ABD: soft, non-tender, non-distended, +BS  Fundus: firm below level of umbilicus  Incision: dressing clean, dry, intact  Perineum: Deferred  Extremities: symmetric, calves nontender    HISTORY:  Patient Active Problem List   Diagnosis    Previous  delivery affecting pregnancy    Prenatal care, subsequent pregnancy    History of back surgery    Poor fetal growth affecting management of mother in third trimester -  resolved       Past Medical History:   Diagnosis Date    Low-lying placenta 2024    3 cm from os      Pelvic pain in female 2025     OB History    Para Term  AB Living   2 2 2     2   SAB IAB Ectopic Molar Multiple Live Births             2      # Outcome Date GA Lbr Julio/2nd Weight Sex Type Anes PTL Lv   2 Term 25 41w1d  3.44 kg (7 lb 9.3 oz) F CS-LTranv EPI, Gen N DYANA      Birth Comments: Fetal intolerance remote from delivery, uterine rupture identified at time of delivery      Complications: Fetal Intolerance   1 Term 22 41w0d  4.082 kg (9 lb) F CS-LTranv   DYANA      Birth Comments: PER OPERATIVE REPORT (see ): LOW TRANSVERSE SKIN, LOW TRANSVERSE UTERINE INCISION, bilateral ovarian cysterctomy     Past Surgical History:   Procedure Laterality Date    REPEAT C SECTION N/A 2025    Procedure:  SECTION, REPEAT;  Surgeon: Delphine Zneg M.D.;  Location: SURGERY LABOR AND DELIVERY;  Service: Obstetrics    PRIMARY C SECTION  2022    removed 2 cysts    EXC./BIOPSY MASS BACK       No Known Allergies   Current Facility-Administered Medications   Medication Dose    lactated ringers infusion  2,000 mL    ibuprofen (Motrin) tablet 800 mg  800 mg    Followed by    [START ON 2025] ibuprofen (Motrin) tablet 800 mg  800 mg    acetaminophen (Tylenol) tablet 1,000 mg  1,000 mg    Followed by    [START ON 2025] acetaminophen (Tylenol) tablet 1,000 mg  1,000 mg    oxyCODONE immediate-release (Roxicodone) tablet 5 mg  5 mg    oxyCODONE immediate release (Roxicodone) tablet 10 mg  10 mg    ondansetron (Zofran) syringe/vial injection 4 mg  4 mg    Or    ondansetron (Zofran ODT) dispertab 4 mg  4 mg    diphenhydrAMINE (Benadryl) tablet/capsule 25 mg  25 mg    Or    diphenhydrAMINE (Benadryl) injection 25 mg  25 mg    docusate sodium (Colace) capsule 100 mg  100 mg    polyethylene glycol/lytes (Miralax) Packet 1 Packet  1 Packet    simethicone  (Mylicon) chewable tablet 125 mg  125 mg    LR infusion      oxytocin (Pitocin) infusion (for post delivery)  125 mL/hr    oxytocin (Pitocin) 0.02 Units/mL  0-20 odette-units/min    ropivacaine 0.2 % (Naropin) injection       Recent Labs     01/14/25  0249 01/14/25  1809 01/15/25  0651   WBC 24.2* 21.4* 16.3*   RBC 3.44* 2.82* 2.65*   HEMOGLOBIN 11.1* 9.0* 8.6*   HEMATOCRIT 32.9* 27.1* 25.5*   MCV 95.6 96.1 96.2   MCH 32.3 31.9 32.5   MCHC 33.7 33.2 33.7   RDW 46.1 45.8 47.4   PLATELETCT 197 174 148*   MPV 9.9 10.1 10.0       Discharge Meds:   Current Outpatient Medications   Medication Sig Dispense Refill    docusate sodium 100 MG Cap Take 1 capsule by mouth 2 times a day as needed for Constipation. 60 Capsule 1    ibuprofen (MOTRIN) 800 MG Tab Take 1 Tablet by mouth every 6 hours as needed for Mild Pain or Moderate Pain for 30 days, THEN 1 Tablet every 6 hours as needed for up to 30 days. 120 Tablet 1    ferrous sulfate 325 (65 Fe) MG tablet Take 1 Tablet by mouth every day. 60 Tablet 2    [START ON 2/14/2025] ethinyl estradiol-etonogestrel (NUVARING) 0.12-0.015 MG/24HR vaginal ring Use for 3 weeks and then take 1 week off or switch to new ring. Not to be dispensed before 02/14/2025 3 Each 4    oxyCODONE immediate-release (ROXICODONE) 5 MG Tab Take 1 Tablet by mouth every four hours as needed for Severe Pain for up to 12 days. 12 Tablet 0    polyethylene glycol/lytes (MIRALAX) Pack Mix and drink 1 Packet by mouth every day. 10 Each 0           Activity/ Discharge Instructions:  Discharge to home  Exercise and Activities as tolerated  Pelvic Rest x 6 weeks  No heavy lifting x4 weeks  Call or come to ED for: heavy vaginal bleeding, fever >100.4, severe abdominal pain, severe headache, chest pain, shortness of breath, significant nausea or vomiting, incisional drainage, or other concerns.  Contraception: plans to get Nuva ring after 5 weeks    Post Partum Hemorrhage  Patient was discharged with stable Hgb level of  8.6. She reported minimal bleeding and was asymptomatic. Pt instructed to return if she has extensive bleeding, or develops signs of infection at the incision site. She was discharged  home with iron supplementation.    Diet:  As tolerated. Additional 400 kcal per day to maintain milk supply. Drink plenty of fluids daily.  Continue prenatal vitamins for six months or as long as breastfeeding.  Continue iron and vitamin C every other day for six months or until anemia improves.     Follow up:    [facility] in one week for incision check for  delivery.      Kaylene Guevara DO  PGY-1 Family Medicine Resident  [facility]

## 2025-01-16 NOTE — LACTATION NOTE
This note was copied from a baby's chart.  Follow-up  visit, mother reports baby is breastfeeding and formula feeding in combination, reports sometimes her latch at breast is pinching and she feels the baby is not opening wide. Discussed waking techniques and techniques to elicit wide gape prior to latch. Discussed using gravity based positioning to help prevent slipping into shallow position. Discussed paced feeding and ensuring baby is showing good mouth positioning on bottle nipple as well during feedings. Offered breastfeeding assistance and mother reports she will call if assist is desired. Plan to continue combination feeding with breast and formula as mother desires. Denies questions/concerns.

## 2025-01-16 NOTE — CARE PLAN
The patient is Stable - Low risk of patient condition declining or worsening    Shift Goals  Clinical Goals: pain control, lochia WDL, emerson  Patient Goals: pain control, rest  Family Goals: bonding and support    Progress made toward(s) clinical / shift goals:      Problem: Pain  Goal: Patient's pain will be alleviated or reduced to the patient’s comfort goal  Description: Target End Date:  Prior to discharge or change in level of care    1.  Document pain using the appropriate pain scale per order or unit policy  2.  Administer pain medications per provider order and/or assist with epidural/spinal placement as needed  3.  Pain management medications as ordered  4.  Educate and implement non-pharmacologic comfort measures (i.e. relaxation, distraction, massage, cold/heat therapy, etc.)  5.  Assess for nonverbal signs of ineffective coping with pain and offer pain medication and/or epidural anesthesia  Outcome: Progressing     Problem: Discharge Barriers/Planning  Goal: Patient's continuum of care needs are met  Description: Target End Date:  Prior to discharge or change in level of care    1.  Identify potential discharge barriers on admission and throughout hospitalization  2.  Collaborate with Case Management, , Clinical Educators, Navigators and others on the transitional care team to meet discharge needs  3.  Involve patient/family/caregivers in setting and prioritizing goals for hospitalization and discharge  4.  Ensure Flu vaccinations are addressed  5.  Inquire if patient is interested in the Meds to Bed program  6.  Ensure patient and family/caregiver are able to demonstrate use of equipment as prescribed  7.  Ensure patient and family/caregiver can verbalize understanding of patient education  8.  Explain discharge instructions and medication reconciliation to patient and family/caregiver  Outcome: Progressing     Problem: Knowledge Deficit - Standard  Goal: Patient and family/care givers  will demonstrate understanding of plan of care, disease process/condition, diagnostic tests and medications  Description: Target End Date:  1-3 days or as soon as patient condition allows    Document in Patient Education    1.  Patient and family/caregiver oriented to unit, equipment, visitation policy and means for communicating concern  2.  Complete/review Learning Assessment  3.  Assess knowledge level of disease process/condition, treatment plan, diagnostic tests and medications  4.  Explain disease process/condition, treatment plan, diagnostic tests and medications  Outcome: Progressing       Patient is not progressing towards the following goals:

## 2025-01-22 ENCOUNTER — GYNECOLOGY VISIT (OUTPATIENT)
Dept: OBGYN | Facility: CLINIC | Age: 30
End: 2025-01-22
Payer: MEDICAID

## 2025-01-22 ENCOUNTER — APPOINTMENT (OUTPATIENT)
Dept: OBGYN | Facility: CLINIC | Age: 30
End: 2025-01-22
Payer: MEDICAID

## 2025-01-22 VITALS — SYSTOLIC BLOOD PRESSURE: 116 MMHG | BODY MASS INDEX: 26.94 KG/M2 | DIASTOLIC BLOOD PRESSURE: 64 MMHG | WEIGHT: 172 LBS

## 2025-01-22 DIAGNOSIS — Z78.9 PRESENCE OF SURGICAL INCISION: ICD-10-CM

## 2025-01-22 PROBLEM — Z34.80 PRENATAL CARE, SUBSEQUENT PREGNANCY: Status: RESOLVED | Noted: 2024-06-11 | Resolved: 2025-01-22

## 2025-01-22 PROBLEM — O36.5930 POOR FETAL GROWTH AFFECTING MANAGEMENT OF MOTHER IN THIRD TRIMESTER: Status: RESOLVED | Noted: 2024-10-30 | Resolved: 2025-01-22

## 2025-01-22 PROCEDURE — 3074F SYST BP LT 130 MM HG: CPT

## 2025-01-22 PROCEDURE — 3078F DIAST BP <80 MM HG: CPT

## 2025-01-22 PROCEDURE — 99024 POSTOP FOLLOW-UP VISIT: CPT

## 2025-01-22 NOTE — PROGRESS NOTES
Subjective   Subjective:    Kelly Alcocer is a 29 y.o. female who presents for c section incision evaluation 1 weeks following repeat  section for fetal intolerance of labor during TOLAC. During her surgery, a uterine rupture was identified.  Her prenatal course was complicated by history of  section. Her postpartum course was complicated by acute blood loss anemia. She reports some left sided tenderness around the incision site that began when she was putting infant in the bassinet in the hospital. She is wondering if she should start physical therapy. She denies dysuria, vaginal bleeding, odor, itching or breast problems. She is breast and formula feeding. Eating a regular diet without difficulty. Bowel movement are Normal.  Pain is controlled with current analgesics.  Medication(s) being used: acetaminophen, prescription NSAID's including ibuprofen, narcotic analgesics including Roxicodone. Light. Patient denies Incisional pain, drainage or redness. Patient denies crying spells, irritability, or mood swings.      Problem List     Patient Active Problem List    Diagnosis Date Noted    Uterine rupture during labor 2025    Previous  delivery affecting pregnancy 2024    History of back surgery 2024       Objective      Lab:  Recent Results (from the past 6 weeks)   GRP B STREP, BY PCR (GAMA BROTH)    Collection Time: 24  3:50 PM    Specimen: Genital   Result Value Ref Range    Strep Gp B DNA PCR Negative Negative   Hold Blood Bank Specimen (Not Tested)    Collection Time: 25 12:20 PM   Result Value Ref Range    Holding Tube - Bb DONE    CBC with differential    Collection Time: 25 12:20 PM   Result Value Ref Range    WBC 9.5 4.8 - 10.8 K/uL    RBC 3.97 (L) 4.20 - 5.40 M/uL    Hemoglobin 12.9 12.0 - 16.0 g/dL    Hematocrit 37.2 37.0 - 47.0 %    MCV 93.7 81.4 - 97.8 fL    MCH 32.5 27.0 - 33.0 pg    MCHC 34.7 32.2 - 35.5 g/dL    RDW 45.3 35.9 - 50.0 fL     Platelet Count 214 164 - 446 K/uL    MPV 10.3 9.0 - 12.9 fL    Neutrophils-Polys 70.80 44.00 - 72.00 %    Lymphocytes 21.50 (L) 22.00 - 41.00 %    Monocytes 6.70 0.00 - 13.40 %    Eosinophils 0.50 0.00 - 6.90 %    Basophils 0.20 0.00 - 1.80 %    Immature Granulocytes 0.30 0.00 - 0.90 %    Nucleated RBC 0.00 0.00 - 0.20 /100 WBC    Neutrophils (Absolute) 6.70 1.82 - 7.42 K/uL    Lymphs (Absolute) 2.04 1.00 - 4.80 K/uL    Monos (Absolute) 0.63 0.00 - 0.85 K/uL    Eos (Absolute) 0.05 0.00 - 0.51 K/uL    Baso (Absolute) 0.02 0.00 - 0.12 K/uL    Immature Granulocytes (abs) 0.03 0.00 - 0.11 K/uL    NRBC (Absolute) 0.00 K/uL   T.PALLIDUM AB ANMOL (Syphilis)    Collection Time: 01/13/25 12:20 PM   Result Value Ref Range    Syphilis, Treponemal Qual Non-Reactive Non-Reactive   CBC WITH DIFFERENTIAL    Collection Time: 01/14/25  2:49 AM   Result Value Ref Range    WBC 24.2 (H) 4.8 - 10.8 K/uL    RBC 3.44 (L) 4.20 - 5.40 M/uL    Hemoglobin 11.1 (L) 12.0 - 16.0 g/dL    Hematocrit 32.9 (L) 37.0 - 47.0 %    MCV 95.6 81.4 - 97.8 fL    MCH 32.3 27.0 - 33.0 pg    MCHC 33.7 32.2 - 35.5 g/dL    RDW 46.1 35.9 - 50.0 fL    Platelet Count 197 164 - 446 K/uL    MPV 9.9 9.0 - 12.9 fL    Neutrophils-Polys 90.70 (H) 44.00 - 72.00 %    Lymphocytes 4.60 (L) 22.00 - 41.00 %    Monocytes 3.70 0.00 - 13.40 %    Eosinophils 0.20 0.00 - 6.90 %    Basophils 0.20 0.00 - 1.80 %    Immature Granulocytes 0.60 0.00 - 0.90 %    Nucleated RBC 0.00 0.00 - 0.20 /100 WBC    Neutrophils (Absolute) 21.95 (H) 1.82 - 7.42 K/uL    Lymphs (Absolute) 1.11 1.00 - 4.80 K/uL    Monos (Absolute) 0.90 (H) 0.00 - 0.85 K/uL    Eos (Absolute) 0.04 0.00 - 0.51 K/uL    Baso (Absolute) 0.04 0.00 - 0.12 K/uL    Immature Granulocytes (abs) 0.15 (H) 0.00 - 0.11 K/uL    NRBC (Absolute) 0.00 K/uL   CBC WITH DIFFERENTIAL    Collection Time: 01/14/25  6:09 PM   Result Value Ref Range    WBC 21.4 (H) 4.8 - 10.8 K/uL    RBC 2.82 (L) 4.20 - 5.40 M/uL    Hemoglobin 9.0 (L) 12.0 - 16.0 g/dL     Hematocrit 27.1 (L) 37.0 - 47.0 %    MCV 96.1 81.4 - 97.8 fL    MCH 31.9 27.0 - 33.0 pg    MCHC 33.2 32.2 - 35.5 g/dL    RDW 45.8 35.9 - 50.0 fL    Platelet Count 174 164 - 446 K/uL    MPV 10.1 9.0 - 12.9 fL    Neutrophils-Polys 81.30 (H) 44.00 - 72.00 %    Lymphocytes 11.50 (L) 22.00 - 41.00 %    Monocytes 6.40 0.00 - 13.40 %    Eosinophils 0.10 0.00 - 6.90 %    Basophils 0.10 0.00 - 1.80 %    Immature Granulocytes 0.60 0.00 - 0.90 %    Nucleated RBC 0.00 0.00 - 0.20 /100 WBC    Neutrophils (Absolute) 17.35 (H) 1.82 - 7.42 K/uL    Lymphs (Absolute) 2.46 1.00 - 4.80 K/uL    Monos (Absolute) 1.36 (H) 0.00 - 0.85 K/uL    Eos (Absolute) 0.03 0.00 - 0.51 K/uL    Baso (Absolute) 0.03 0.00 - 0.12 K/uL    Immature Granulocytes (abs) 0.13 (H) 0.00 - 0.11 K/uL    NRBC (Absolute) 0.00 K/uL   CBC WITHOUT DIFFERENTIAL    Collection Time: 01/15/25  6:51 AM   Result Value Ref Range    WBC 16.3 (H) 4.8 - 10.8 K/uL    RBC 2.65 (L) 4.20 - 5.40 M/uL    Hemoglobin 8.6 (L) 12.0 - 16.0 g/dL    Hematocrit 25.5 (L) 37.0 - 47.0 %    MCV 96.2 81.4 - 97.8 fL    MCH 32.5 27.0 - 33.0 pg    MCHC 33.7 32.2 - 35.5 g/dL    RDW 47.4 35.9 - 50.0 fL    Platelet Count 148 (L) 164 - 446 K/uL    MPV 10.0 9.0 - 12.9 fL     Vitals:    25 1400   BP: 116/64   Weight: 172 lb     Vitals:    25 1400   BP: 116/64     Objective    Well nourished female in no acute distress  A& O x 3  Heart RRR  LCTAB  No edema noted and pulses WNL bilaterally in lower extremities; no claudication  BS x 4; no guarding or tenderness  Breasts: no erythema or discharge. No masses or tenderness.   Abdomen: well healing, well approximated incision noted extending horizontally at suprapubic arch above pubic hair. No erythema or purulent drainage.       Assessment   Assessment:    1. PP care following  delivery    Plan   Plan:    1. Discussed continued care of incision. Patient is educated on reasons to seek immediate attention including fever, incisional pain,  significant bleeding or if wound appears open. General hygiene reviewed with patient. All questions answered. Will defer physical therapy referral at this time as no indication but may consider at time of PP visit if appropriate. Encouraged to schedule PP visit in 4 weeks.

## 2025-01-22 NOTE — PROGRESS NOTES
Patient here for C Section check  Pt states : physical therapy   C Section done on : 01/13/2025  Patient is : breast and bottle   PP visit : will schedule   Phone number: [phone]  Pharmacy verified

## 2025-02-03 ENCOUNTER — TELEPHONE (OUTPATIENT)
Dept: OBGYN | Facility: CLINIC | Age: 30
End: 2025-02-03
Payer: MEDICAID

## 2025-02-03 NOTE — TELEPHONE ENCOUNTER
Called pt and informed  that her [facility] paperwork has been completed, and faxed to the number provided.  Originals scanned into media and placed at the . Pt informed she can  originals at her earliest convenience. BRANT.

## 2025-02-21 ENCOUNTER — TELEPHONE (OUTPATIENT)
Dept: OBGYN | Facility: CLINIC | Age: 30
End: 2025-02-21
Payer: MEDICAID

## 2025-03-07 ENCOUNTER — EH NON-PROVIDER (OUTPATIENT)
Dept: OCCUPATIONAL MEDICINE | Facility: CLINIC | Age: 30
End: 2025-03-07

## 2025-03-07 ENCOUNTER — HOSPITAL ENCOUNTER (OUTPATIENT)
Facility: MEDICAL CENTER | Age: 30
End: 2025-03-07
Attending: NURSE PRACTITIONER
Payer: COMMERCIAL

## 2025-03-07 DIAGNOSIS — Z02.89 ENCOUNTER FOR OCCUPATIONAL HEALTH EXAMINATION: ICD-10-CM

## 2025-03-07 DIAGNOSIS — Z02.89 ENCOUNTER FOR OCCUPATIONAL HEALTH EXAMINATION: Primary | ICD-10-CM

## 2025-03-07 LAB
AMP AMPHETAMINE: NORMAL
BAR BARBITURATES: NORMAL
BZO BENZODIAZEPINES: NORMAL
COC COCAINE: NORMAL
INT CON NEG: NORMAL
INT CON POS: NORMAL
MDMA ECSTASY: NORMAL
MET METHAMPHETAMINES: NORMAL
MTD METHADONE: NORMAL
OPI OPIATES: NORMAL
OXY OXYCODONE: NORMAL
PCP PHENCYCLIDINE: NORMAL
POC URINE DRUG SCREEN OCDRS: NORMAL
THC: NORMAL

## 2025-03-07 PROCEDURE — 86787 VARICELLA-ZOSTER ANTIBODY: CPT | Performed by: NURSE PRACTITIONER

## 2025-03-07 PROCEDURE — 86762 RUBELLA ANTIBODY: CPT | Performed by: NURSE PRACTITIONER

## 2025-03-07 PROCEDURE — 86480 TB TEST CELL IMMUN MEASURE: CPT | Performed by: NURSE PRACTITIONER

## 2025-03-07 PROCEDURE — 86735 MUMPS ANTIBODY: CPT | Performed by: NURSE PRACTITIONER

## 2025-03-07 PROCEDURE — 86765 RUBEOLA ANTIBODY: CPT | Performed by: NURSE PRACTITIONER

## 2025-03-07 PROCEDURE — 80305 DRUG TEST PRSMV DIR OPT OBS: CPT | Performed by: NURSE PRACTITIONER

## 2025-03-09 LAB
MEV IGG SER-ACNC: 73.3 AU/ML
MUV IGG SER IA-ACNC: 197 AU/ML
RUBV AB SER QL: >500 IU/ML
VZV IGG SER IA-ACNC: 2.1 S/CO

## 2025-03-10 LAB
GAMMA INTERFERON BACKGROUND BLD IA-ACNC: 0.04 IU/ML
M TB IFN-G BLD-IMP: NEGATIVE
M TB IFN-G CD4+ BCKGRND COR BLD-ACNC: 0 IU/ML
MITOGEN IGNF BCKGRD COR BLD-ACNC: 2.72 IU/ML
QFT TB2 - NIL TBQ2: 0.03 IU/ML

## 2025-03-12 ENCOUNTER — TELEPHONE (OUTPATIENT)
Dept: OBGYN | Facility: CLINIC | Age: 30
End: 2025-03-12
Payer: MEDICAID

## 2025-03-12 NOTE — TELEPHONE ENCOUNTER
Phone Number Called: [phone]    Call outcome: Left detailed message for patient. Informed to call back with any additional questions.    Message: Called pt and LVM stating that I have completed her medical leave paperwork and that it has been faxed to her employer. Provided office phone number in case of any questions/concerns. Call ended.  Paperwork scanned into chart.

## 2025-03-19 ENCOUNTER — TELEPHONE (OUTPATIENT)
Dept: OBGYN | Facility: CLINIC | Age: 30
End: 2025-03-19
Payer: MEDICAID

## 2025-03-19 NOTE — TELEPHONE ENCOUNTER
Shilpi from [facility] called asked if we can verify information on pt's disability form. I let her know that all information on her disability is true and correct. Shilpi verbalized understanding. Call ended.

## 2025-05-20 ENCOUNTER — OFFICE VISIT (OUTPATIENT)
Dept: DERMATOLOGY | Facility: IMAGING CENTER | Age: 30
End: 2025-05-20
Payer: COMMERCIAL

## 2025-05-20 DIAGNOSIS — D48.5 NEOPLASM OF UNCERTAIN BEHAVIOR OF SKIN: Primary | ICD-10-CM

## 2025-05-20 PROCEDURE — 11102 TANGNTL BX SKIN SINGLE LES: CPT | Performed by: STUDENT IN AN ORGANIZED HEALTH CARE EDUCATION/TRAINING PROGRAM

## 2025-05-20 PROCEDURE — 11103 TANGNTL BX SKIN EA SEP/ADDL: CPT | Performed by: STUDENT IN AN ORGANIZED HEALTH CARE EDUCATION/TRAINING PROGRAM

## 2025-05-20 NOTE — PROGRESS NOTES
[facility] DERMATOLOGY CLINIC NOTE    No chief complaint on file.         HPI:    Kelly Alcocer is a 29 y.o. female presenting today for evaluation of skin growths.     1.) Skin growth on: back x 3   Duration: many years    Associated symptoms: growth, catching on clothing. Lesion at top center of back fell off after being traumatized and then regrew    Prior treatments: none       No other symptomatic (itching, painful, burning) or changing lesions.       Dermatology History:      - Skin cancer risk factors: none      - Family history of skin cancer: none     - Prior skin cancers: none        Review of Systems: No fevers, chill. Pertinent positives and negatives above.       Medications, Medical History, Surgical History, Family History & Allergies:  Reviewed in the chart, relevant history noted above.         PHYSICAL EXAM, ASSESSMENT, & PLAN (per problem):   A focused skin exam was performed including the affected areas of the back. Notable findings on exam today listed below and/or in assessment/plan.       Neoplasm of uncertain significance, x3  A.) UPPER BACK - multillobulated brown plaque with central pink atrophy - recurrent nevi vs atypia vs melanoma   B.) LEFT BACK SUPERIOR - brown papule - nevi vs other   C.) LEFT BACK INFERIOR - brown papule nevi vs other     - discussed options today, proceed with biopsy. Further treatment may be recommended     Shave Biopsy Procedure Note x3:   Risks, benefits and alternatives of procedure discussed, verbal consent obtained for photo (see chart) and informed consent obtained for procedure. Time out completed. Area of biopsy prepped with alcohol. Anesthesia with 1% lidocaine with epinephrine administered intradermally with 30 gauge needle. Shave biopsy of the site performed. Hemostasis achieved with pressure and aluminum chloride. Vaseline applied to wound with bandage. Patient tolerated procedure well and there were no complications. Wound care was discussed. Instructed  to call clinic if patient experiences any complications such as post-operative bleeding, infection.        Follow up: Return if symptoms worsen or fail to improve.        Serenity Mendenhall MD   [facility]

## 2025-05-22 ENCOUNTER — APPOINTMENT (OUTPATIENT)
Dept: DERMATOLOGY | Facility: IMAGING CENTER | Age: 30
End: 2025-05-22

## 2025-05-27 ENCOUNTER — RESULTS FOLLOW-UP (OUTPATIENT)
Dept: DERMATOLOGY | Facility: IMAGING CENTER | Age: 30
End: 2025-05-27
Payer: COMMERCIAL

## 2025-06-27 ENCOUNTER — APPOINTMENT (OUTPATIENT)
Dept: DERMATOLOGY | Facility: IMAGING CENTER | Age: 30
End: 2025-06-27

## 2025-07-10 ENCOUNTER — APPOINTMENT (OUTPATIENT)
Dept: DERMATOLOGY | Facility: IMAGING CENTER | Age: 30
End: 2025-07-10

## 2025-07-24 ENCOUNTER — OFFICE VISIT (OUTPATIENT)
Dept: DERMATOLOGY | Facility: IMAGING CENTER | Age: 30
End: 2025-07-24
Payer: COMMERCIAL

## 2025-07-24 DIAGNOSIS — D22.9 MULTIPLE BENIGN NEVI: Primary | ICD-10-CM

## 2025-07-24 PROCEDURE — 99212 OFFICE O/P EST SF 10 MIN: CPT | Performed by: STUDENT IN AN ORGANIZED HEALTH CARE EDUCATION/TRAINING PROGRAM

## 2025-07-24 NOTE — PROGRESS NOTES
[facility] DERMATOLOGY CLINIC NOTE    Chief Complaint   Patient presents with    Skin Lesion          HPI:    Kelly Alcocer is a 29 y.o. female presenting today for evaluation of skin growths.     1.) Skin growth on: rt cheek  Duration: a mole that she has had her whole life, but developed a scab like area on the side over last few days   Associated symptoms: scab,   Prior treatments: none       No other symptomatic (itching, painful, burning) or changing lesions.       Dermatology History:      - Skin cancer risk factors: none      - Family history of skin cancer: none     - Prior skin cancers: none        Review of Systems: No fevers, chill. Pertinent positives and negatives above.       Medications, Medical History, Surgical History, Family History & Allergies:  Reviewed in the chart, relevant history noted above.         PHYSICAL EXAM, ASSESSMENT, & PLAN (per problem):   A focused skin exam was performed including the affected areas of the back. Notable findings on exam today listed below and/or in assessment/plan.       Benign appearing melanocytic nevi, traumatized   Exam: pink brown papule on right temple with eccentric small foci of scale/crust scab     - Discussed nevi are benign appearing on exam, symmetrical and regular pigment network on dermoscopy   - ABCDEs of melanoma discussed, call office for sooner evaluation for concerning changes/growth  - believe lesion is traumatized, recommend recheck in 1-2 weeks, hold biopsy in reserve if not healing           Follow up: No follow-ups on file.        Serenity Mendenhall MD   [facility] Dermatology

## 2025-07-30 ENCOUNTER — OFFICE VISIT (OUTPATIENT)
Dept: DERMATOLOGY | Facility: IMAGING CENTER | Age: 30
End: 2025-07-30

## 2025-07-30 ENCOUNTER — OFFICE VISIT (OUTPATIENT)
Dept: DERMATOLOGY | Facility: IMAGING CENTER | Age: 30
End: 2025-07-30
Payer: COMMERCIAL

## 2025-07-30 DIAGNOSIS — D48.5 NEOPLASM OF UNCERTAIN BEHAVIOR OF SKIN: Primary | ICD-10-CM

## 2025-07-30 DIAGNOSIS — Z41.1 ENCOUNTER FOR COSMETIC PROCEDURE: Primary | ICD-10-CM

## 2025-07-30 NOTE — PROGRESS NOTES
[facility] DERMATOLOGY CLINIC NOTE    No chief complaint on file.         HPI:    Kelly Alcocer is a 29 y.o. female presenting today for evaluation of skin growths.     1.) Skin growth on: bilateral flanks   Get caught on clothing, irritated         No other symptomatic (itching, painful, burning) or changing lesions.       Dermatology History:      - Skin cancer risk factors: none      - Family history of skin cancer: none     - Prior skin cancers: none        Review of Systems: No fevers, chill. Pertinent positives and negatives above.       Medications, Medical History, Surgical History, Family History & Allergies:  Reviewed in the chart, relevant history noted above.         PHYSICAL EXAM, ASSESSMENT, & PLAN (per problem):   A focused skin exam was performed including the affected areas of the back. Notable findings on exam today listed below and/or in assessment/plan.       Neoplasm of uncertain significance, x2  Exam: bilateral flank, ddx nevi vs other     - discussed options today, proceed with biopsy. Further treatment may be recommended     Shave Biopsy Procedure Note x2:   Risks, benefits and alternatives of procedure discussed, verbal consent obtained for photo (see chart) and informed consent obtained for procedure. Time out completed. Area of biopsy prepped with alcohol. Anesthesia with 1% lidocaine with epinephrine administered intradermally with 30 gauge needle. Shave biopsy of the site performed. Hemostasis achieved with pressure and aluminum chloride. Vaseline applied to wound with bandage. Patient tolerated procedure well and there were no complications. Wound care was discussed. Instructed to call clinic if patient experiences any complications such as post-operative bleeding, infection.             Follow up: No follow-ups on file.        Serenity Mendenhall MD   [facility] Dermatology

## 2025-07-30 NOTE — PROGRESS NOTES
Procedure Note for Cosmetic Botulinum Injection    HPI:    Patient presents for botulinum toxin injection for rhytides.    Received botulinum toxin product in last 3 mos: no - first time  Lactating/pregnant: no  Known NMJ disease: no    Physical Exam:  - hyperfunctional rhytides on the foreahead, mikayla-orbital region    Assessment/ Plan:     # Rhytides, botox consult  Botox (OnabotulinumtoxinA) was discussed for treatment of rhyrides. Reviewed contraindications: pregnancy, breastfeeding, history of neuromuscular disorder, allergy to botulinum toxin. Reviewed effects of Botox to minimize muscle movement that leads to rhytides, this is a temporary effect usually lasting about 3-6 months.     INFORMED CONSENT   The risks and benefits of the procedure were explained to the patient, and all questions were answered. Adverse effects from toxin injection include but are not limited to: hypersensitivity, eyelid and eyebrow drooping, and asymmetry.  Common adverse effects from the injection itself are pain and bruising. The patient demonstrated good understanding of risks and benefits and consents to botulinum toxin injection.     PROCEDURE:  Injection sites were cleaned with alcohol. 30 gauge needles were used to inject the Botox.  The patient tolerated the procedure well. Hemostasis achieved.  There were no complications.     MEDICATION USED:  Botulinum toxin type A, mixed with sterile, preservative-free normal saline. See dilution, units used and placement on scanned media.     Site injected: glabella, forehead  Total units injected: 22 -- see attached media tab         Counseled patient it may take 7-10 days for full onset of botulinum toxin efficacy.        Return to clinic as needed

## 2025-08-05 ENCOUNTER — OFFICE VISIT (OUTPATIENT)
Facility: MEDICAL CENTER | Age: 30
End: 2025-08-05
Payer: COMMERCIAL

## 2025-08-05 VITALS
SYSTOLIC BLOOD PRESSURE: 122 MMHG | WEIGHT: 175 LBS | BODY MASS INDEX: 27.47 KG/M2 | HEIGHT: 67 IN | DIASTOLIC BLOOD PRESSURE: 80 MMHG

## 2025-08-05 DIAGNOSIS — O34.219 PREVIOUS CESAREAN DELIVERY AFFECTING PREGNANCY: ICD-10-CM

## 2025-08-05 DIAGNOSIS — Z30.49 ENCOUNTER FOR SURVEILLANCE OF INTRAVAGINAL CONTRACEPTIVE: Primary | ICD-10-CM

## 2025-08-05 PROCEDURE — 99213 OFFICE O/P EST LOW 20 MIN: CPT | Performed by: OBSTETRICS & GYNECOLOGY

## 2025-08-05 PROCEDURE — 3074F SYST BP LT 130 MM HG: CPT | Performed by: OBSTETRICS & GYNECOLOGY

## 2025-08-05 PROCEDURE — 3079F DIAST BP 80-89 MM HG: CPT | Performed by: OBSTETRICS & GYNECOLOGY

## 2025-08-05 RX ORDER — ETONOGESTREL AND ETHINYL ESTRADIOL VAGINAL RING .015; .12 MG/D; MG/D
RING VAGINAL
Qty: 3 EACH | Refills: 4 | Status: SHIPPED | OUTPATIENT
Start: 2025-08-05

## 2025-08-12 ENCOUNTER — OFFICE VISIT (OUTPATIENT)
Dept: DERMATOLOGY | Facility: IMAGING CENTER | Age: 30
End: 2025-08-12

## 2025-08-12 DIAGNOSIS — L98.8 OTHER SPECIFIED DISORDERS OF THE SKIN AND SUBCUTANEOUS TISSUE: ICD-10-CM

## 2025-08-12 PROCEDURE — 99999 PR NO CHARGE: CPT | Performed by: STUDENT IN AN ORGANIZED HEALTH CARE EDUCATION/TRAINING PROGRAM

## 2025-08-22 ENCOUNTER — APPOINTMENT (OUTPATIENT)
Dept: DERMATOLOGY | Facility: IMAGING CENTER | Age: 30
End: 2025-08-22

## 2025-08-27 ENCOUNTER — PATIENT MESSAGE (OUTPATIENT)
Facility: MEDICAL CENTER | Age: 30
End: 2025-08-27
Payer: COMMERCIAL

## (undated) DEVICE — SLEEVE VASO DVT COMPRESSION CALF MED - (10PR/CA)

## (undated) DEVICE — CANISTER SUCTION RIGID RED 1500CC (40EA/CA)

## (undated) DEVICE — SET EXTENSION WITH 2 PORTS (48EA/CA) ***PART #2C8610 IS A SUBSTITUTE*****

## (undated) DEVICE — SPONGE SURGICAL PVP IODINE L8 IN (20EA/CA)

## (undated) DEVICE — BLANKET STERILE CHICKIE FOR L&D (100/CA)

## (undated) DEVICE — RETRACTOR O C SECTION X-LRY - (5/BX)

## (undated) DEVICE — CANNULA O2 COMFORT SOFT EAR ADULT 7 FT TUBING (50/CA)

## (undated) DEVICE — TUBING CLEARLINK DUO-VENT - C-FLO (48EA/CA)

## (undated) DEVICE — SLEEVE VASO DVT COMPRESSION CALF BARIATRIC (10PR/BX 20EA/BX)

## (undated) DEVICE — SUTURE 0 VICRYL PLUS CT-1 - 36 INCH (36/BX)

## (undated) DEVICE — RETRACTOR O C SECTION LRY - (5/BX)

## (undated) DEVICE — SOLUTION PLASMA-LYTE PH 7.4 INJ 1000ML (14EA/CA)

## (undated) DEVICE — KIT SKIN NOSE AND MOUTH PRE-OP (20/CA)

## (undated) DEVICE — PLUMEPEN ULTRA 3/8 IN X 10 FT HOSE (20EA/CA)

## (undated) DEVICE — SUTURE 4-0 27IN VCRL PLUS ANTI (36PK/BX)

## (undated) DEVICE — PAD LAP STERILE 18 X 18 - (5/PK 40PK/CA)

## (undated) DEVICE — HEAD HOLDER JUNIOR/ADULT

## (undated) DEVICE — WATER IRRIGATION STERILE 1000ML (12EA/CA)

## (undated) DEVICE — PACK C-SECTION (2EA/CA)

## (undated) DEVICE — DRESSING POST OP BORDER 4 X 10 (5EA/BX)

## (undated) DEVICE — SLEEVE VASO DVT COMPRESSION CALF LRG - (10PR/CA)

## (undated) DEVICE — PACK ROOM TURNOVER L&D (12/CA)

## (undated) DEVICE — CHLORAPREP 26 ML APPLICATOR - ORANGE TINT(25/CA)

## (undated) DEVICE — BAG SPONGE COUNT 10.25 X 32 - BLUE (250/CA)

## (undated) DEVICE — GLOVE BIOGEL INDICATOR SZ 7SURGICAL PF LTX - (50/BX 4BX/CA)

## (undated) DEVICE — SODIUM CHL IRRIGATION 0.9% 1000ML (12EA/CA)

## (undated) DEVICE — SUTURE 3-0 VICRYL PLUS CT-1 - 36 INCH (36/BX)

## (undated) DEVICE — TRAY SPINAL ANESTHESIA NON-SAFETY (10/CA)

## (undated) DEVICE — BLANKET UNDERBODY ADULT - (10/CA)

## (undated) DEVICE — CATHETER IV NON-SAFETY 18 GA X 1 1/4 (50/BX 4BX/CA)

## (undated) DEVICE — GLOVE BIOGEL SZ 6.5 SURGICAL PF LTX (50PR/BX 4BX/CA)

## (undated) DEVICE — KIT  I.V. START (100EA/CA)

## (undated) DEVICE — CANISTER SUCTION 3000ML MECHANICAL FILTER AUTO SHUTOFF MEDI-VAC NONSTERILE LF DISP (40EA/CA)